# Patient Record
Sex: MALE | Race: BLACK OR AFRICAN AMERICAN | HISPANIC OR LATINO | Employment: UNEMPLOYED | URBAN - METROPOLITAN AREA
[De-identification: names, ages, dates, MRNs, and addresses within clinical notes are randomized per-mention and may not be internally consistent; named-entity substitution may affect disease eponyms.]

---

## 2023-11-08 PROBLEM — S06.9XAA: Status: ACTIVE | Noted: 2023-11-08

## 2023-11-08 PROBLEM — S43.421A SPRAIN OF RIGHT ROTATOR CUFF CAPSULE: Status: ACTIVE | Noted: 2023-11-08

## 2023-11-08 PROBLEM — M19.019 ARTHROPATHY OF SHOULDER REGION: Status: ACTIVE | Noted: 2023-11-08

## 2023-11-08 PROBLEM — M16.0 OSTEOARTHRITIS OF BOTH HIPS: Status: ACTIVE | Noted: 2023-11-08

## 2023-11-08 PROBLEM — Z79.891 LONG TERM (CURRENT) USE OF OPIATE ANALGESIC: Status: ACTIVE | Noted: 2023-11-08

## 2023-11-08 PROBLEM — M75.81: Status: ACTIVE | Noted: 2023-11-08

## 2023-11-08 RX ORDER — PANTOPRAZOLE SODIUM 40 MG/1
40 TABLET, DELAYED RELEASE ORAL DAILY
COMMUNITY
Start: 2023-01-27

## 2023-11-08 RX ORDER — METFORMIN HYDROCHLORIDE 1000 MG/1
1000 TABLET ORAL 2 TIMES DAILY
COMMUNITY

## 2023-11-08 RX ORDER — NAPROXEN 500 MG/1
500 TABLET ORAL 2 TIMES DAILY
COMMUNITY

## 2023-11-08 RX ORDER — ATORVASTATIN CALCIUM 40 MG/1
40 TABLET, FILM COATED ORAL
COMMUNITY
Start: 2023-10-30

## 2023-11-08 RX ORDER — AMLODIPINE BESYLATE 5 MG/1
5 TABLET ORAL DAILY
COMMUNITY
Start: 2023-07-28

## 2023-11-08 RX ORDER — NAPROXEN SODIUM 220 MG/1
81 TABLET, FILM COATED ORAL DAILY
COMMUNITY
Start: 2023-09-29

## 2023-11-08 RX ORDER — BISACODYL 10 MG/1
10 SUPPOSITORY RECTAL DAILY PRN
COMMUNITY
Start: 2023-01-31 | End: 2023-11-09 | Stop reason: ALTCHOICE

## 2023-11-08 RX ORDER — LUBIPROSTONE 24 UG/1
1 CAPSULE ORAL DAILY
COMMUNITY
Start: 2020-07-30

## 2023-11-08 RX ORDER — CELECOXIB 200 MG/1
200 CAPSULE ORAL 2 TIMES DAILY
COMMUNITY
Start: 2023-07-16

## 2023-11-08 RX ORDER — DICLOFENAC SODIUM 10 MG/G
4 GEL TOPICAL 4 TIMES DAILY
COMMUNITY
Start: 2023-09-09 | End: 2024-02-29 | Stop reason: SDUPTHER

## 2023-11-08 RX ORDER — CARVEDILOL 12.5 MG/1
12.5 TABLET ORAL
COMMUNITY
Start: 2023-10-30

## 2023-11-08 RX ORDER — ALLOPURINOL 300 MG/1
300 TABLET ORAL DAILY
COMMUNITY

## 2023-11-08 RX ORDER — BLOOD SUGAR DIAGNOSTIC
1 STRIP MISCELLANEOUS 2 TIMES DAILY
COMMUNITY
Start: 2023-06-17

## 2023-11-08 RX ORDER — AMOXICILLIN 250 MG
1 CAPSULE ORAL 2 TIMES DAILY
COMMUNITY
Start: 2014-12-18

## 2023-11-08 RX ORDER — ISOPROPYL ALCOHOL 0.75 G/1
1 SWAB TOPICAL 4 TIMES DAILY
COMMUNITY
Start: 2023-09-20

## 2023-11-08 RX ORDER — ENZALUTAMIDE 80 MG/1
80 TABLET ORAL
COMMUNITY
Start: 2023-07-20

## 2023-11-08 RX ORDER — LOVASTATIN 40 MG/1
40 TABLET ORAL NIGHTLY
COMMUNITY

## 2023-11-08 RX ORDER — COLCHICINE 0.6 MG/1
0.6 TABLET ORAL DAILY
COMMUNITY
Start: 2023-10-05

## 2023-11-08 RX ORDER — METOPROLOL TARTRATE 50 MG/1
50 TABLET ORAL 2 TIMES DAILY
COMMUNITY

## 2023-11-08 RX ORDER — ENALAPRIL MALEATE 10 MG/1
10 TABLET ORAL 2 TIMES DAILY
COMMUNITY

## 2023-11-08 RX ORDER — IBUPROFEN 800 MG/1
800 TABLET ORAL EVERY 8 HOURS PRN
COMMUNITY
Start: 2023-06-19

## 2023-11-08 RX ORDER — PYRIDOXINE HCL (VITAMIN B6) 50 MG
TABLET ORAL
COMMUNITY
Start: 2015-03-26

## 2023-11-08 RX ORDER — NALOXONE HYDROCHLORIDE 4 MG/.1ML
4 SPRAY NASAL AS NEEDED
COMMUNITY

## 2023-11-08 RX ORDER — DOCUSATE SODIUM 100 MG/1
100 CAPSULE, LIQUID FILLED ORAL 2 TIMES DAILY PRN
COMMUNITY
Start: 2023-10-05

## 2023-11-08 RX ORDER — SENNOSIDES 8.6 MG
1 TABLET ORAL 2 TIMES DAILY
COMMUNITY
Start: 2023-04-28

## 2023-11-08 RX ORDER — DEXAMETHASONE 4 MG/1
4 TABLET ORAL DAILY
COMMUNITY
Start: 2023-02-09

## 2023-11-08 RX ORDER — GABAPENTIN 300 MG/1
300 CAPSULE ORAL
COMMUNITY
Start: 2023-09-24

## 2023-11-08 RX ORDER — AMLODIPINE BESYLATE 10 MG/1
10 TABLET ORAL
COMMUNITY
Start: 2023-09-30

## 2023-11-08 RX ORDER — METHOCARBAMOL 750 MG/1
1 TABLET, FILM COATED ORAL 2 TIMES DAILY
COMMUNITY
Start: 2016-04-25

## 2023-11-08 RX ORDER — VALPROIC ACID 250 MG/1
750 CAPSULE, LIQUID FILLED ORAL 2 TIMES DAILY
COMMUNITY
Start: 2023-10-06

## 2023-11-08 RX ORDER — ACETAMINOPHEN 500 MG
1000 TABLET ORAL 2 TIMES DAILY PRN
COMMUNITY
Start: 2023-01-27

## 2023-11-08 RX ORDER — LANCETS
1 EACH MISCELLANEOUS 2 TIMES DAILY
COMMUNITY
Start: 2023-05-01

## 2023-11-08 RX ORDER — OXYCODONE AND ACETAMINOPHEN 10; 325 MG/1; MG/1
1 TABLET ORAL 4 TIMES DAILY PRN
COMMUNITY
Start: 2022-09-24 | End: 2023-11-09 | Stop reason: SDUPTHER

## 2023-11-09 ENCOUNTER — OFFICE VISIT (OUTPATIENT)
Dept: PAIN MEDICINE | Facility: CLINIC | Age: 63
End: 2023-11-09
Payer: COMMERCIAL

## 2023-11-09 DIAGNOSIS — S43.421A SPRAIN OF RIGHT ROTATOR CUFF CAPSULE, INITIAL ENCOUNTER: ICD-10-CM

## 2023-11-09 DIAGNOSIS — M75.81 INFRASPINATUS TENDINITIS, RIGHT: ICD-10-CM

## 2023-11-09 DIAGNOSIS — M19.019 ARTHROPATHY OF SHOULDER REGION: Primary | ICD-10-CM

## 2023-11-09 PROCEDURE — 99214 OFFICE O/P EST MOD 30 MIN: CPT | Performed by: PHYSICAL MEDICINE & REHABILITATION

## 2023-11-09 RX ORDER — OXYCODONE AND ACETAMINOPHEN 10; 325 MG/1; MG/1
1 TABLET ORAL 4 TIMES DAILY PRN
Qty: 112 TABLET | Refills: 0 | Status: SHIPPED | OUTPATIENT
Start: 2023-12-11 | End: 2023-12-21 | Stop reason: SDUPTHER

## 2023-11-09 RX ORDER — BISACODYL 5 MG
5 TABLET, DELAYED RELEASE (ENTERIC COATED) ORAL DAILY PRN
Qty: 30 TABLET | Refills: 1 | Status: SHIPPED | OUTPATIENT
Start: 2023-11-09 | End: 2023-12-09

## 2023-11-09 RX ORDER — OXYCODONE AND ACETAMINOPHEN 10; 325 MG/1; MG/1
1 TABLET ORAL 4 TIMES DAILY PRN
Qty: 112 TABLET | Refills: 0 | Status: SHIPPED | OUTPATIENT
Start: 2023-11-13 | End: 2023-12-11

## 2023-11-09 NOTE — PROGRESS NOTES
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 Rodrigues returns today for follow-up visit.  His wife came with him.  He is now using the wheelchair because injury to the hip none related to this pain he is treating with Ortho for the right hip and he get injections.    His main pain is on the right shoulder.The pain in the righ shoulder is deep and stabbing.  It is associated with sharp sensation inside the shoulder joint mainly with movements.  The pain is continuous at rest and it gets worse with reaching forward and overhead.  Also reaching outward increases the pain.  There is no radiation of the pain to the upper limb.  The pain is associated with tight muscle bands around the shoulder blade.  These gets worse with shoulder stabilization like working at shoulder level or even doing any activity with the upper limb when it is not supported.  There is occasional sensation of fine cracking inside the shoulder joint when the shoulder position is changed rapidly.  Also occasionally there is a sensation of fullness inside the shoulder joint.  The right shoulder catches when it reaches at shoulder level.  With maneuvering and external rotation the shoulder can go up few more degrees.  The right shoulder gets tired quickly with any activity.  Of note that we gave him an injection in the right shoulder in the past and those helped him he want to repeat those      Pain level without medication is 8/10 , with the medication pain level 2-3/10.       opioids treatment agreement 2023  Oarrs pulled and scanned in the chart  no concerns  last urine toxicology testing May of this year and was compatible we will repeat  Xray updated right shoulder  ORT Score is 0  Pain pathology and pain generators right shoulder  Modalities tried injection, surgery, physical therapy, TENS unit,  nonsteroidal anti-inflammatory medication       Denied any fever or chills. No weight loss and no night sweats. No cough or sputum production. No diarrhea   The constipation has been responding to fibers and over the counter medications.     No bladder and bowel incontinence and no other changes in bladder and bowel. No skin changes.  Reports tiredness and fatigability only if the pain is not controlled.   Denied opioids diversion and abuse and denies alcoholism. Denies overuse of the pain medications.    The control of the pain with the pain medications is helping the control of the symptoms and allowing the function and activities of daily living, enjoyment of life, improving the quality of life and sleep with less interruption by the pain. The goal is symptomatic control of the nonmalignant chronic pain and not to repair the permanent damage in the tissues inducing the chronic pain conditions. We are aiming to shift the focus from the nonmalignant chronic pain to other aspects of life by symptomatically treating this chronic pain. If this pain is not treated it will lead to major morbidity and it is also associated with increased risks of mortality. The patient understands those very clearly and also understand high risks of morbidity and mortality if not strictly adherent to the treatment recommendations and reporting any associated side effects. Also patient understand the full responsibility associated with these medications to avoid abuse or overuse or any use of these medications for anything besides treating the patient's own chronic pain and nothing else under any circumstances.        Physical examination  Awake, alert and oriented for time place and persons   declined Chaperone for the visit and was adequately  draped for the exam.      The right shoulder physical examination showed mild atrophy of the right supraspinatus compared to the left side.  Impingement at 90 degrees in forward flexion and  abduction.  This improved by few degrees upon external rotation.  Tight muscle bands and trigger points around the shoulder blade muscles.  No overt shoulder instability but tenderness on palpation of the subacromial area.  Negative cervical root tension sign.  Right shoulder strength is 4/5 in all directions.  No distal sensorimotor deficits associated with the right shoulder.      Diagnosis  Problem List Items Addressed This Visit       Arthropathy of shoulder region - Primary    Sprain of right rotator cuff capsule    Infraspinatus tendinitis, right        Plan  Reviewed the pain generators.  Went over the types of pain with neuropathic and nociceptive and different pathologies and therapeutic modalities. Discussed the mechanism of action of interventions from acupuncture, physical therapy , regular exercises, injections, botox, spinal cord stimulation, and role of surgery     Went over pathology of the intervertebral disc displacement and the anatomical relation to the Nerve roots and relation to the radicular symptoms. Went over treatment modalities with conservative treatment including acupuncture   and epidural steroid injection with fluoroscopy guidance and last resort of surgery    Based on the above findings and the clinical response to the opioids medications and improvement of the activities of daily living, sleep, and work performance. We made this complex decision to continue the opioids therapy in light of the evidence of the patient's responsibility in using the pain medications as prescribed for the nonmalignant chronic pain condition. We discussed about the use of the pain medications to treat the symptoms of chronic nonmalignant pain and we are not trying the repair the permanent damage in the tissues, rather we are trying to control the symptoms induced by the permanent damage to the tissues inducing the chronic pain condition and resulting disability. I explained the difference and discussed it  with the patient and stressed the importance of knowing the difference especially because of the potential side effects and the potential addicting effect and habit forming nature of the dangerous drugs we are using to treat the symptoms of the chronic pain.      We discussed that we are prescribing the medications on good giorgio and legitimate medical reason.     We reviewed the side effects and precautions of opioids prescriptions as discussed in the opioids treatment agreement.    realizes the interaction between the therapeutic classes including the respiratory depression and potential death     Random drug testing twice in 6 months we will submit     Oxycodone 10 mg 4 times a day.  He uses nonsteroidal anti-inflammatory medication over-the-counter.opioids sparing effect to allow keeping the opioids dose at minimal effective dose.  I went over the potential side effects of the NSAIDS on the gastrointestinal, renal and cardiovascular systems.       I detailed the side effects from the acetaminophen in the medication and made aware of those. I also explained about the cumulative effects on the organs and mainly the liver.     Given the opioids therapy , we discussed about the risk for accidental over dose on the pain medications, either for patient or other household. I went over the mechanism of action and mode of use of the Naloxone according to the  recommendations. I will provide a prescription for a kit.     Follow-up 8 weeks or earlier if needed     The level of clinical decision making in this office visit,  is high, given the high risks of complications with the morbidity and mortality due to the fact that acute and chronic pain may pose a threat to life and bodily function, if under treated, poorly treated, or with failure to maintain adequate treatment and timely medical follow up. Additionally over treatment has its own set of complications including overdosing on the pain medications and  also the habit forming potentials with the use of the medications used to treat chronic painful conditions including therapeutic classes classified as dangerous medications. Given the serious and fluctuating nature of pain level and instensity with extensive consideration for whenever pain changes, there is always the risk of prolonged functional impairment requiring close patient monitoring with regular assessments and reassessments and high level medical decision making at every office visit. The amount and complexity of data reviewed is high given the patient clinical presentation, labs,  data, radiology reports, and other tests as discussed during office visits. Pertinent data whether positive or negative were taken in consideration in the process of making this high level medical decision.    For the right shoulder we will put in a C9 to ask [facility] for right shoulder intra-articular steroid injection

## 2023-12-21 ENCOUNTER — OFFICE VISIT (OUTPATIENT)
Dept: PAIN MEDICINE | Facility: CLINIC | Age: 63
End: 2023-12-21
Payer: COMMERCIAL

## 2023-12-21 DIAGNOSIS — S43.421A SPRAIN OF RIGHT ROTATOR CUFF CAPSULE, INITIAL ENCOUNTER: ICD-10-CM

## 2023-12-21 DIAGNOSIS — M75.81 INFRASPINATUS TENDINITIS, RIGHT: ICD-10-CM

## 2023-12-21 DIAGNOSIS — M19.019 ARTHROPATHY OF SHOULDER REGION: Primary | ICD-10-CM

## 2023-12-21 PROCEDURE — 99214 OFFICE O/P EST MOD 30 MIN: CPT | Performed by: PHYSICAL MEDICINE & REHABILITATION

## 2023-12-21 PROCEDURE — 20610 DRAIN/INJ JOINT/BURSA W/O US: CPT | Performed by: PHYSICAL MEDICINE & REHABILITATION

## 2023-12-21 RX ORDER — OXYCODONE AND ACETAMINOPHEN 10; 325 MG/1; MG/1
1 TABLET ORAL 4 TIMES DAILY PRN
Qty: 112 TABLET | Refills: 0 | Status: SHIPPED | OUTPATIENT
Start: 2024-01-08 | End: 2024-01-11 | Stop reason: SDUPTHER

## 2023-12-21 RX ORDER — OXYCODONE AND ACETAMINOPHEN 10; 325 MG/1; MG/1
1 TABLET ORAL 4 TIMES DAILY PRN
Qty: 112 TABLET | Refills: 0 | Status: SHIPPED | OUTPATIENT
Start: 2024-02-05 | End: 2024-02-29 | Stop reason: SDUPTHER

## 2023-12-21 NOTE — PROGRESS NOTES
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  It is associated with sharp sensation inside the shoulder joint mainly with movements.  The pain is continuous at rest and it gets worse with reaching forward and overhead.  Also reaching outward increases the pain.  There is no radiation of the pain to the upper limb.  The pain is associated with tight muscle bands around the shoulder blade.  These gets worse with shoulder stabilization like working at shoulder level or even doing any activity with the upper limb when it is not supported.  There is occasional sensation of fine cracking inside the shoulder joint when the shoulder position is changed rapidly.  Also occasionally there is a sensation of fullness inside the shoulder joint.  The right shoulder catches when it reaches at shoulder level.  With maneuvering and external rotation the shoulder can go up few more degrees.  The right shoulder gets tired quickly with any activity.       Pain level without medication is 8/10 , with the medication pain level 4/10.     The pain meds are helping control the pain and improving Activities of Daily living and quality of life and quality of sleep.    opioids treatment agreement 2023  Oarrs pulled and scanned in the chart  no concerns  last urine toxicology testing earlier this year and it was compliant we will repeat  Xray updated shoulder 0  ORT Score is  0  Pain pathology and pain generators shoulder   Modalities tried injection, surgery, physical therapy, TENS unit, nonsteroidal anti-inflammatory medication injection and multiple surgeriey     Denied any fever or chills. No weight loss and no night sweats. No cough or sputum production. No diarrhea   The constipation has been  responding to fibers and over the counter medications.     No bladder and bowel incontinence and no other changes in bladder and bowel. No skin changes.  Reports tiredness and fatigability only if the pain is not controlled.   Denied opioids diversion and abuse and denies alcoholism. Denies overuse of the pain medications.    The control of the pain with the pain medications is helping the control of the symptoms and allowing the function and activities of daily living, enjoyment of life, improving the quality of life and sleep with less interruption by the pain. The goal is symptomatic control of the nonmalignant chronic pain and not to repair the permanent damage in the tissues inducing the chronic pain conditions. We are aiming to shift the focus from the nonmalignant chronic pain to other aspects of life by symptomatically treating this chronic pain. If this pain is not treated it will lead to major morbidity and it is also associated with increased risks of mortality. The patient understands those very clearly and also understand high risks of morbidity and mortality if not strictly adherent to the treatment recommendations and reporting any associated side effects. Also patient understand the full responsibility associated with these medications to avoid abuse or overuse or any use of these medications for anything besides treating the patient's own chronic pain and nothing else under any circumstances.        Physical examination  Awake, alert and oriented for time place and persons   declined Chaperone for the visit and was adequately  draped for the exam.      The right shoulder physical examination showed mild atrophy of the right supraspinatus compared to the left side.  Impingement at 10 degrees in forward flexion and abduction.  This improved by few degrees upon external rotation.  Tight muscle bands and trigger points around the shoulder blade muscles.  No overt shoulder instability but tenderness on  palpation of the subacromial area.  Negative cervical root tension sign.  Right shoulder strength is 4/5 in all directions.  No distal sensorimotor deficits associated with the right shoulder.     Diagnosis  Problem List Items Addressed This Visit       Arthropathy of shoulder region - Primary    Sprain of right rotator cuff capsule    Infraspinatus tendinitis, right        Plan  Reviewed the pain generators.  Went over the types of pain with neuropathic and nociceptive and different pathologies and therapeutic modalities. Discussed the mechanism of action of interventions from acupuncture, physical therapy , regular exercises, injections, botox, spinal cord stimulation, and role of surgery     Went over pathology of the intervertebral disc displacement and the anatomical relation to the Nerve roots and relation to the radicular symptoms. Went over treatment modalities with conservative treatment including acupuncture   and epidural steroid injection with fluoroscopy guidance and last resort of surgery    Based on the above findings and the clinical response to the opioids medications and improvement of the activities of daily living, sleep, and work performance. We made this complex decision to continue the opioids therapy in light of the evidence of the patient's responsibility in using the pain medications as prescribed for the nonmalignant chronic pain condition. We discussed about the use of the pain medications to treat the symptoms of chronic nonmalignant pain and we are not trying the repair the permanent damage in the tissues, rather we are trying to control the symptoms induced by the permanent damage to the tissues inducing the chronic pain condition and resulting disability. I explained the difference and discussed it with the patient and stressed the importance of knowing the difference especially because of the potential side effects and the potential addicting effect and habit forming nature of the  dangerous drugs we are using to treat the symptoms of the chronic pain.      We discussed that we are prescribing the medications on good giorgio and legitimate medical reason.     We reviewed the side effects and precautions of opioids prescriptions as discussed in the opioids treatment agreement.    realizes the interaction between the therapeutic classes including the respiratory depression and potential death     Random drug testing twice in 6 months we will submit     Oxycoconde 10qid   has a narcan at home know how and when to use it if needed.     Discussed about NSAIDS and I explained about the opioids sparing effect to allow keeping the opioids dose at minimal effective dose.   I went over the potential side effects of the NSAIDS on the gastrointestinal, renal and cardiovascular systems.      I detailed the side effects from the acetaminophen in the medication and made aware of those. I also explained about the cumulative effects on the organs and mainly the liver.     Given the opioids therapy , we discussed about the risk for accidental over dose on the pain medications, either for patient or other household. I went over the mechanism of action and mode of use of the Naloxone according to the  recommendations. I will provide a prescription for a kit.     Follow-up 8 weeks or earlier if needed     The level of clinical decision making in this office visit,  is high, given the high risks of complications with the morbidity and mortality due to the fact that acute and chronic pain may pose a threat to life and bodily function, if under treated, poorly treated, or with failure to maintain adequate treatment and timely medical follow up. Additionally over treatment has its own set of complications including overdosing on the pain medications and also the habit forming potentials with the use of the medications used to treat chronic painful conditions including therapeutic classes classified as  dangerous medications. Given the serious and fluctuating nature of pain level and instensity with extensive consideration for whenever pain changes, there is always the risk of prolonged functional impairment requiring close patient monitoring with regular assessments and reassessments and high level medical decision making at every office visit. The amount and complexity of data reviewed is high given the patient clinical presentation, labs,  data, radiology reports, and other tests as discussed during office visits. Pertinent data whether positive or negative were taken in consideration in the process of making this high level medical decision.      He is approved for the R shoulder intraarticular steroid injection   We discussed the risks and benefits of the injections including but not limited to nerve injury, infection, bleeding, headaches and paralysis and remotely death. The patient agreed to proceed, will perform the procedure with sterile techniques. Declined Chaperone for the procedure.  After appropriate draping, I used 40 mg of kenalog mixed with 5 cc of lidocaine and injected the right shoulder      post procedure care was discussed with the patient who agreed to proceed.  procedure tolerated very well. No complications encountered.

## 2024-01-11 ENCOUNTER — TELEPHONE (OUTPATIENT)
Dept: PAIN MEDICINE | Facility: CLINIC | Age: 64
End: 2024-01-11
Payer: COMMERCIAL

## 2024-01-11 DIAGNOSIS — M75.81 INFRASPINATUS TENDINITIS, RIGHT: ICD-10-CM

## 2024-01-11 DIAGNOSIS — S43.421A SPRAIN OF RIGHT ROTATOR CUFF CAPSULE, INITIAL ENCOUNTER: ICD-10-CM

## 2024-01-11 DIAGNOSIS — M19.019 ARTHROPATHY OF SHOULDER REGION: ICD-10-CM

## 2024-01-11 RX ORDER — OXYCODONE AND ACETAMINOPHEN 10; 325 MG/1; MG/1
1 TABLET ORAL 4 TIMES DAILY PRN
Qty: 84 TABLET | Refills: 0 | Status: SHIPPED | OUTPATIENT
Start: 2024-01-11 | End: 2024-02-01

## 2024-01-11 NOTE — TELEPHONE ENCOUNTER
Pt asking for balance of #84 percocet to be sent to pharmacy on file. Was only given #28 pills for 7 days due to change of insurance

## 2024-02-29 ENCOUNTER — OFFICE VISIT (OUTPATIENT)
Dept: PAIN MEDICINE | Facility: CLINIC | Age: 64
End: 2024-02-29
Payer: COMMERCIAL

## 2024-02-29 DIAGNOSIS — M19.019 ARTHROPATHY OF SHOULDER REGION: Primary | ICD-10-CM

## 2024-02-29 DIAGNOSIS — M75.81 INFRASPINATUS TENDINITIS, RIGHT: ICD-10-CM

## 2024-02-29 DIAGNOSIS — S43.421A SPRAIN OF RIGHT ROTATOR CUFF CAPSULE, INITIAL ENCOUNTER: ICD-10-CM

## 2024-02-29 PROCEDURE — 99214 OFFICE O/P EST MOD 30 MIN: CPT | Performed by: PHYSICAL MEDICINE & REHABILITATION

## 2024-02-29 RX ORDER — OXYCODONE AND ACETAMINOPHEN 10; 325 MG/1; MG/1
1 TABLET ORAL 4 TIMES DAILY PRN
Qty: 112 TABLET | Refills: 0 | Status: SHIPPED | OUTPATIENT
Start: 2024-04-01 | End: 2024-04-29

## 2024-02-29 RX ORDER — METHYLPREDNISOLONE 4 MG/1
TABLET ORAL
Qty: 21 TABLET | Refills: 0 | Status: SHIPPED | OUTPATIENT
Start: 2024-02-29

## 2024-02-29 RX ORDER — DICLOFENAC SODIUM 10 MG/G
4 GEL TOPICAL 4 TIMES DAILY
Qty: 120 G | Refills: 2 | Status: SHIPPED | OUTPATIENT
Start: 2024-02-29

## 2024-02-29 RX ORDER — OXYCODONE AND ACETAMINOPHEN 10; 325 MG/1; MG/1
1 TABLET ORAL 4 TIMES DAILY PRN
Qty: 112 TABLET | Refills: 0 | Status: SHIPPED | OUTPATIENT
Start: 2024-03-04 | End: 2024-04-01

## 2024-02-29 NOTE — PROGRESS NOTES
[place] [facility] [id number]  DOI 12.27.2002   · Arthropathy of shoulder region (716.91) (M19.019)   · Infraspinatus tendinitis, right (726.10) (M75.81)    Chief complaint  Pain in R shoulder    History  Mr Rodrigues is back for visit  Continues to have pain in the r shoulder  (Unrelated he has prostate cancer getting treatment)  Severe shoulder limited ROM  The pain in the righ shoulder is deep and stabbing.  It is associated with sharp sensation inside the shoulder joint mainly with movements.  The pain is continuous at rest and it gets worse with reaching forward and overhead.  Also reaching outward increases the pain.  There is no radiation of the pain to the upper limb.  The pain is associated with tight muscle bands around the shoulder blade.  These gets worse with shoulder stabilization like working at shoulder level or even doing any activity with the upper limb when it is not supported.  There is occasional sensation of fine cracking inside the shoulder joint when the shoulder position is changed rapidly.  Also occasionally there is a sensation of fullness inside the shoulder joint.  The right shoulder catches when it reaches at shoulder level.  With maneuvering and external rotation the shoulder can go up few more degrees.  The right shoulder gets tired quickly with any activity.       Pain level without medication is 8/10 , with the medication pain level 3 to 4 /10.     The pain meds are helping control the pain and improving Activities of Daily living and quality of life and quality of sleep.    opioids treatment agreement Feb 2024  PDI (Pain Disability Index) score: 62  Oarrs pulled and scanned in the chart  no concerns  last urine toxicology testing earlier this year and it was compliant we will repeat  Xray updated spine  ORT Score is  0  Pain pathology and pain generators shoulder   Modalities tried injection, surgery, physical therapy, TENS unit, nonsteroidal anti-inflammatory medication       Denied any  fever or chills. No weight loss and no night sweats. No cough or sputum production. No diarrhea   The constipation has been responding to fibers and over the counter medications.     No bladder and bowel incontinence and no other changes in bladder and bowel. No skin changes.  Reports tiredness and fatigability only if the pain is not controlled.   Denied opioids diversion and abuse and denies alcoholism. Denies overuse of the pain medications.    The control of the pain with the pain medications is helping the control of the symptoms and allowing the function and activities of daily living, enjoyment of life, improving the quality of life and sleep with less interruption by the pain. The goal is symptomatic control of the nonmalignant chronic pain and not to repair the permanent damage in the tissues inducing the chronic pain conditions. We are aiming to shift the focus from the nonmalignant chronic pain to other aspects of life by symptomatically treating this chronic pain. If this pain is not treated it will lead to major morbidity and it is also associated with increased risks of mortality. The patient understands those very clearly and also understand high risks of morbidity and mortality if not strictly adherent to the treatment recommendations and reporting any associated side effects. Also patient understand the full responsibility associated with these medications to avoid abuse or overuse or any use of these medications for anything besides treating the patient's own chronic pain and nothing else under any circumstances.        Physical examination  Awake, alert and oriented for time place and persons   declined Chaperone for the visit and was adequately  draped for the exam.    The right shoulder physical examination showed mild atrophy of the right supraspinatus compared to the left side.  Impingement at 30 degrees in forward flexion and abduction.  This improved by few degrees upon external rotation.   Tight muscle bands and trigger points around the shoulder blade muscles.  No overt shoulder instability but tenderness on palpation of the subacromial area.  Negative cervical root tension sign.  Right shoulder strength is 4/5 in all directions.  No distal sensorimotor deficits associated with the right shoulder.     Diagnosis  Problem List Items Addressed This Visit       Arthropathy of shoulder region - Primary    Relevant Medications    oxyCODONE-acetaminophen (Percocet)  mg tablet (Start on 3/4/2024)    oxyCODONE-acetaminophen (Percocet)  mg tablet (Start on 4/1/2024)    diclofenac sodium (Voltaren) 1 % gel    methylPREDNISolone (Medrol Dospak) 4 mg tablets    Sprain of right rotator cuff capsule    Relevant Medications    oxyCODONE-acetaminophen (Percocet)  mg tablet (Start on 3/4/2024)    oxyCODONE-acetaminophen (Percocet)  mg tablet (Start on 4/1/2024)    diclofenac sodium (Voltaren) 1 % gel    methylPREDNISolone (Medrol Dospak) 4 mg tablets    Infraspinatus tendinitis, right    Relevant Medications    oxyCODONE-acetaminophen (Percocet)  mg tablet (Start on 3/4/2024)    oxyCODONE-acetaminophen (Percocet)  mg tablet (Start on 4/1/2024)    diclofenac sodium (Voltaren) 1 % gel    methylPREDNISolone (Medrol Dospak) 4 mg tablets        Plan  Reviewed the pain generators.  Went over the types of pain with neuropathic and nociceptive and different pathologies and therapeutic modalities. Discussed the mechanism of action of interventions from acupuncture, physical therapy , regular exercises, injections, botox, spinal cord stimulation, and role of surgery     Went over pathology of the intervertebral disc displacement and the anatomical relation to the Nerve roots and relation to the radicular symptoms. Went over treatment modalities with conservative treatment including acupuncture   and epidural steroid injection with fluoroscopy guidance and last resort of surgery    Based on  the above findings and the clinical response to the opioids medications and improvement of the activities of daily living, sleep, and work performance. We made this complex decision to continue the opioids therapy in light of the evidence of the patient's responsibility in using the pain medications as prescribed for the nonmalignant chronic pain condition. We discussed about the use of the pain medications to treat the symptoms of chronic nonmalignant pain and we are not trying the repair the permanent damage in the tissues, rather we are trying to control the symptoms induced by the permanent damage to the tissues inducing the chronic pain condition and resulting disability. I explained the difference and discussed it with the patient and stressed the importance of knowing the difference especially because of the potential side effects and the potential addicting effect and habit forming nature of the dangerous drugs we are using to treat the symptoms of the chronic pain.      We discussed that we are prescribing the medications on good giorgio and legitimate medical reason.     We reviewed the side effects and precautions of opioids prescriptions as discussed in the opioids treatment agreement.    realizes the interaction between the therapeutic classes including the respiratory depression and potential death     Random drug testing twice in 6 months we will submit     Oxycodenoe 10 qid  Consider PNS  has a narcan at home know how and when to use it if needed.  Discussed about considering cut back on pain medications     Discussed about NSAIDS and I explained about the opioids sparing effect to allow keeping the opioids dose at minimal effective dose.   I went over the potential side effects of the NSAIDS on the gastrointestinal, renal and cardiovascular systems.      I detailed the side effects from the acetaminophen in the medication and made aware of those. I also explained about the cumulative effects on the  organs and mainly the liver.     Given the opioids therapy , we discussed about the risk for accidental over dose on the pain medications, either for patient or other household. I went over the mechanism of action and mode of use of the Naloxone according to the  recommendations. I will provide a prescription for a kit.     Follow-up 8 weeks or earlier if needed     The level of clinical decision making in this office visit,  is high, given the high risks of complications with the morbidity and mortality due to the fact that acute and chronic pain may pose a threat to life and bodily function, if under treated, poorly treated, or with failure to maintain adequate treatment and timely medical follow up. Additionally over treatment has its own set of complications including overdosing on the pain medications and also the habit forming potentials with the use of the medications used to treat chronic painful conditions including therapeutic classes classified as dangerous medications. Given the serious and fluctuating nature of pain level and instensity with extensive consideration for whenever pain changes, there is always the risk of prolonged functional impairment requiring close patient monitoring with regular assessments and reassessments and high level medical decision making at every office visit. The amount and complexity of data reviewed is high given the patient clinical presentation, labs,  data, radiology reports, and other tests as discussed during office visits. Pertinent data whether positive or negative were taken in consideration in the process of making this high level medical decision.

## 2024-04-25 ENCOUNTER — APPOINTMENT (OUTPATIENT)
Dept: PAIN MEDICINE | Facility: CLINIC | Age: 64
End: 2024-04-25
Payer: COMMERCIAL

## 2024-05-03 ENCOUNTER — LAB REQUISITION (OUTPATIENT)
Dept: LAB | Facility: HOSPITAL | Age: 64
End: 2024-05-03
Payer: COMMERCIAL

## 2024-05-03 DIAGNOSIS — R63.0 ANOREXIA: ICD-10-CM

## 2024-05-03 DIAGNOSIS — D64.9 ANEMIA, UNSPECIFIED: ICD-10-CM

## 2024-05-03 LAB
BASOPHILS # BLD AUTO: 0 X10*3/UL (ref 0–0.1)
BASOPHILS NFR BLD AUTO: 0 %
EOSINOPHIL # BLD AUTO: 0.04 X10*3/UL (ref 0–0.7)
EOSINOPHIL NFR BLD AUTO: 1.4 %
ERYTHROCYTE [DISTWIDTH] IN BLOOD BY AUTOMATED COUNT: 16.9 % (ref 11.5–14.5)
HCT VFR BLD AUTO: 24.8 % (ref 41–52)
HGB BLD-MCNC: 7.6 G/DL (ref 13.5–17.5)
IMM GRANULOCYTES # BLD AUTO: 0 X10*3/UL (ref 0–0.7)
IMM GRANULOCYTES NFR BLD AUTO: 0 % (ref 0–0.9)
LYMPHOCYTES # BLD AUTO: 0.44 X10*3/UL (ref 1.2–4.8)
LYMPHOCYTES NFR BLD AUTO: 15.3 %
MCH RBC QN AUTO: 27.6 PG (ref 26–34)
MCHC RBC AUTO-ENTMCNC: 30.6 G/DL (ref 32–36)
MCV RBC AUTO: 90 FL (ref 80–100)
MONOCYTES # BLD AUTO: 0.31 X10*3/UL (ref 0.1–1)
MONOCYTES NFR BLD AUTO: 10.8 %
NEUTROPHILS # BLD AUTO: 2.09 X10*3/UL (ref 1.2–7.7)
NEUTROPHILS NFR BLD AUTO: 72.5 %
NRBC BLD-RTO: 0 /100 WBCS (ref 0–0)
PLATELET # BLD AUTO: 106 X10*3/UL (ref 150–450)
RBC # BLD AUTO: 2.75 X10*6/UL (ref 4.5–5.9)
WBC # BLD AUTO: 2.9 X10*3/UL (ref 4.4–11.3)

## 2024-05-03 PROCEDURE — 85025 COMPLETE CBC W/AUTO DIFF WBC: CPT

## 2024-07-16 ENCOUNTER — APPOINTMENT (OUTPATIENT)
Dept: CARDIOLOGY | Facility: HOSPITAL | Age: 64
DRG: 393 | End: 2024-07-16
Payer: MEDICARE

## 2024-07-16 ENCOUNTER — HOSPITAL ENCOUNTER (INPATIENT)
Facility: HOSPITAL | Age: 64
Discharge: NURSING, REHAB OR LONG-TERM CARE | DRG: 393 | End: 2024-07-16
Attending: STUDENT IN AN ORGANIZED HEALTH CARE EDUCATION/TRAINING PROGRAM | Admitting: INTERNAL MEDICINE
Payer: MEDICARE

## 2024-07-16 ENCOUNTER — APPOINTMENT (OUTPATIENT)
Dept: RADIOLOGY | Facility: HOSPITAL | Age: 64
DRG: 393 | End: 2024-07-16
Payer: MEDICARE

## 2024-07-16 DIAGNOSIS — K92.2 GASTROINTESTINAL HEMORRHAGE, UNSPECIFIED GASTROINTESTINAL HEMORRHAGE TYPE: Primary | ICD-10-CM

## 2024-07-16 DIAGNOSIS — I10 HYPERTENSION, UNSPECIFIED TYPE: ICD-10-CM

## 2024-07-16 LAB
ABO GROUP (TYPE) IN BLOOD: NORMAL
ABO GROUP (TYPE) IN BLOOD: NORMAL
ALBUMIN SERPL BCP-MCNC: 3.4 G/DL (ref 3.4–5)
ALP SERPL-CCNC: 83 U/L (ref 33–136)
ALT SERPL W P-5'-P-CCNC: 5 U/L (ref 10–52)
ANION GAP SERPL CALC-SCNC: 11 MMOL/L (ref 10–20)
ANTIBODY SCREEN: NORMAL
ANTIBODY SCREEN: NORMAL
APTT PPP: 37 SECONDS (ref 27–38)
AST SERPL W P-5'-P-CCNC: 10 U/L (ref 9–39)
BASOPHILS # BLD AUTO: 0.06 X10*3/UL (ref 0–0.1)
BASOPHILS NFR BLD AUTO: 2.4 %
BILIRUB SERPL-MCNC: 0.4 MG/DL (ref 0–1.2)
BUN SERPL-MCNC: 11 MG/DL (ref 6–23)
CALCIUM SERPL-MCNC: 7.9 MG/DL (ref 8.6–10.3)
CHLORIDE SERPL-SCNC: 102 MMOL/L (ref 98–107)
CO2 SERPL-SCNC: 26 MMOL/L (ref 21–32)
CREAT SERPL-MCNC: 1.12 MG/DL (ref 0.5–1.3)
EGFRCR SERPLBLD CKD-EPI 2021: 74 ML/MIN/1.73M*2
EOSINOPHIL # BLD AUTO: 0.26 X10*3/UL (ref 0–0.7)
EOSINOPHIL NFR BLD AUTO: 10.6 %
ERYTHROCYTE [DISTWIDTH] IN BLOOD BY AUTOMATED COUNT: 16.3 % (ref 11.5–14.5)
GLUCOSE BLD MANUAL STRIP-MCNC: 103 MG/DL (ref 74–99)
GLUCOSE BLD MANUAL STRIP-MCNC: 99 MG/DL (ref 74–99)
GLUCOSE SERPL-MCNC: 104 MG/DL (ref 74–99)
HCT VFR BLD AUTO: 33.9 % (ref 41–52)
HCT VFR BLD AUTO: 34.1 % (ref 41–52)
HGB BLD-MCNC: 10.6 G/DL (ref 13.5–17.5)
HGB BLD-MCNC: 11.1 G/DL (ref 13.5–17.5)
IMM GRANULOCYTES # BLD AUTO: 0.05 X10*3/UL (ref 0–0.7)
IMM GRANULOCYTES NFR BLD AUTO: 2 % (ref 0–0.9)
INR PPP: 1.3 (ref 0.9–1.1)
LACTATE SERPL-SCNC: 1.1 MMOL/L (ref 0.4–2)
LYMPHOCYTES # BLD AUTO: 0.23 X10*3/UL (ref 1.2–4.8)
LYMPHOCYTES NFR BLD AUTO: 9.3 %
MCH RBC QN AUTO: 27.6 PG (ref 26–34)
MCHC RBC AUTO-ENTMCNC: 31.3 G/DL (ref 32–36)
MCV RBC AUTO: 88 FL (ref 80–100)
MONOCYTES # BLD AUTO: 0.62 X10*3/UL (ref 0.1–1)
MONOCYTES NFR BLD AUTO: 25.2 %
NEUTROPHILS # BLD AUTO: 1.24 X10*3/UL (ref 1.2–7.7)
NEUTROPHILS NFR BLD AUTO: 50.5 %
NRBC BLD-RTO: 0 /100 WBCS (ref 0–0)
PLATELET # BLD AUTO: 93 X10*3/UL (ref 150–450)
POTASSIUM SERPL-SCNC: 4 MMOL/L (ref 3.5–5.3)
PROT SERPL-MCNC: 5.9 G/DL (ref 6.4–8.2)
PROTHROMBIN TIME: 14.1 SECONDS (ref 9.8–12.8)
RBC # BLD AUTO: 3.84 X10*6/UL (ref 4.5–5.9)
RH FACTOR (ANTIGEN D): NORMAL
RH FACTOR (ANTIGEN D): NORMAL
SODIUM SERPL-SCNC: 135 MMOL/L (ref 136–145)
WBC # BLD AUTO: 2.5 X10*3/UL (ref 4.4–11.3)

## 2024-07-16 PROCEDURE — 93005 ELECTROCARDIOGRAM TRACING: CPT

## 2024-07-16 PROCEDURE — 2500000004 HC RX 250 GENERAL PHARMACY W/ HCPCS (ALT 636 FOR OP/ED): Performed by: STUDENT IN AN ORGANIZED HEALTH CARE EDUCATION/TRAINING PROGRAM

## 2024-07-16 PROCEDURE — 85014 HEMATOCRIT: CPT | Performed by: PHYSICIAN ASSISTANT

## 2024-07-16 PROCEDURE — 99223 1ST HOSP IP/OBS HIGH 75: CPT | Performed by: STUDENT IN AN ORGANIZED HEALTH CARE EDUCATION/TRAINING PROGRAM

## 2024-07-16 PROCEDURE — 36415 COLL VENOUS BLD VENIPUNCTURE: CPT | Performed by: PHYSICIAN ASSISTANT

## 2024-07-16 PROCEDURE — 99223 1ST HOSP IP/OBS HIGH 75: CPT | Performed by: PHYSICIAN ASSISTANT

## 2024-07-16 PROCEDURE — G0378 HOSPITAL OBSERVATION PER HR: HCPCS

## 2024-07-16 PROCEDURE — 2500000001 HC RX 250 WO HCPCS SELF ADMINISTERED DRUGS (ALT 637 FOR MEDICARE OP): Performed by: PHYSICIAN ASSISTANT

## 2024-07-16 PROCEDURE — 36415 COLL VENOUS BLD VENIPUNCTURE: CPT | Performed by: STUDENT IN AN ORGANIZED HEALTH CARE EDUCATION/TRAINING PROGRAM

## 2024-07-16 PROCEDURE — 86901 BLOOD TYPING SEROLOGIC RH(D): CPT | Performed by: STUDENT IN AN ORGANIZED HEALTH CARE EDUCATION/TRAINING PROGRAM

## 2024-07-16 PROCEDURE — 2500000001 HC RX 250 WO HCPCS SELF ADMINISTERED DRUGS (ALT 637 FOR MEDICARE OP): Performed by: STUDENT IN AN ORGANIZED HEALTH CARE EDUCATION/TRAINING PROGRAM

## 2024-07-16 PROCEDURE — 85025 COMPLETE CBC W/AUTO DIFF WBC: CPT | Performed by: STUDENT IN AN ORGANIZED HEALTH CARE EDUCATION/TRAINING PROGRAM

## 2024-07-16 PROCEDURE — 71045 X-RAY EXAM CHEST 1 VIEW: CPT

## 2024-07-16 PROCEDURE — 99285 EMERGENCY DEPT VISIT HI MDM: CPT

## 2024-07-16 PROCEDURE — 83605 ASSAY OF LACTIC ACID: CPT | Performed by: STUDENT IN AN ORGANIZED HEALTH CARE EDUCATION/TRAINING PROGRAM

## 2024-07-16 PROCEDURE — 85610 PROTHROMBIN TIME: CPT | Performed by: STUDENT IN AN ORGANIZED HEALTH CARE EDUCATION/TRAINING PROGRAM

## 2024-07-16 PROCEDURE — C9113 INJ PANTOPRAZOLE SODIUM, VIA: HCPCS | Performed by: STUDENT IN AN ORGANIZED HEALTH CARE EDUCATION/TRAINING PROGRAM

## 2024-07-16 PROCEDURE — 80053 COMPREHEN METABOLIC PANEL: CPT | Performed by: STUDENT IN AN ORGANIZED HEALTH CARE EDUCATION/TRAINING PROGRAM

## 2024-07-16 PROCEDURE — 71045 X-RAY EXAM CHEST 1 VIEW: CPT | Performed by: RADIOLOGY

## 2024-07-16 PROCEDURE — 82947 ASSAY GLUCOSE BLOOD QUANT: CPT

## 2024-07-16 RX ORDER — FLUTICASONE PROPIONATE 50 MCG
2 SPRAY, SUSPENSION (ML) NASAL DAILY
COMMUNITY

## 2024-07-16 RX ORDER — ERGOCALCIFEROL 1.25 MG/1
1.25 CAPSULE ORAL
COMMUNITY

## 2024-07-16 RX ORDER — ALBUTEROL SULFATE 0.83 MG/ML
2.5 SOLUTION RESPIRATORY (INHALATION) EVERY 4 HOURS PRN
Status: DISCONTINUED | OUTPATIENT
Start: 2024-07-16 | End: 2024-07-16

## 2024-07-16 RX ORDER — ONDANSETRON HYDROCHLORIDE 8 MG/1
8 TABLET, FILM COATED ORAL EVERY 6 HOURS PRN
COMMUNITY

## 2024-07-16 RX ORDER — MESALAMINE 1000 MG/1
1000 SUPPOSITORY RECTAL DAILY PRN
COMMUNITY

## 2024-07-16 RX ORDER — INSULIN LISPRO 100 [IU]/ML
0-5 INJECTION, SOLUTION INTRAVENOUS; SUBCUTANEOUS
Status: DISCONTINUED | OUTPATIENT
Start: 2024-07-16 | End: 2024-07-22 | Stop reason: HOSPADM

## 2024-07-16 RX ORDER — CARVEDILOL 12.5 MG/1
12.5 TABLET ORAL 2 TIMES DAILY
Status: DISCONTINUED | OUTPATIENT
Start: 2024-07-16 | End: 2024-07-22 | Stop reason: HOSPADM

## 2024-07-16 RX ORDER — ACETAMINOPHEN 325 MG/1
650 TABLET ORAL EVERY 4 HOURS PRN
COMMUNITY

## 2024-07-16 RX ORDER — PANTOPRAZOLE SODIUM 40 MG/10ML
40 INJECTION, POWDER, LYOPHILIZED, FOR SOLUTION INTRAVENOUS 2 TIMES DAILY
Status: DISCONTINUED | OUTPATIENT
Start: 2024-07-17 | End: 2024-07-22 | Stop reason: HOSPADM

## 2024-07-16 RX ORDER — ONDANSETRON 4 MG/1
4 TABLET, FILM COATED ORAL EVERY 6 HOURS PRN
Status: DISCONTINUED | OUTPATIENT
Start: 2024-07-16 | End: 2024-07-22 | Stop reason: HOSPADM

## 2024-07-16 RX ORDER — BISACODYL 10 MG/1
10 SUPPOSITORY RECTAL DAILY PRN
COMMUNITY

## 2024-07-16 RX ORDER — POLYETHYLENE GLYCOL 3350 17 G/17G
17 POWDER, FOR SOLUTION ORAL 2 TIMES DAILY
COMMUNITY

## 2024-07-16 RX ORDER — CALCIUM CARBONATE 200(500)MG
2 TABLET,CHEWABLE ORAL 3 TIMES DAILY
Status: DISCONTINUED | OUTPATIENT
Start: 2024-07-16 | End: 2024-07-22 | Stop reason: HOSPADM

## 2024-07-16 RX ORDER — DARBEPOETIN ALFA 100 UG/.5ML
100 INJECTION, SOLUTION INTRAVENOUS; SUBCUTANEOUS
COMMUNITY

## 2024-07-16 RX ORDER — FUROSEMIDE 40 MG/1
40 TABLET ORAL DAILY
COMMUNITY

## 2024-07-16 RX ORDER — GABAPENTIN 300 MG/1
300 CAPSULE ORAL 2 TIMES DAILY
Status: DISCONTINUED | OUTPATIENT
Start: 2024-07-16 | End: 2024-07-22 | Stop reason: HOSPADM

## 2024-07-16 RX ORDER — LORATADINE 10 MG/1
10 TABLET ORAL DAILY
COMMUNITY

## 2024-07-16 RX ORDER — FUROSEMIDE 40 MG/1
40 TABLET ORAL DAILY
Status: DISCONTINUED | OUTPATIENT
Start: 2024-07-16 | End: 2024-07-22 | Stop reason: HOSPADM

## 2024-07-16 RX ORDER — AMLODIPINE BESYLATE 10 MG/1
10 TABLET ORAL DAILY
Status: DISCONTINUED | OUTPATIENT
Start: 2024-07-16 | End: 2024-07-22 | Stop reason: HOSPADM

## 2024-07-16 RX ORDER — ERGOCALCIFEROL 1.25 MG/1
1250 CAPSULE ORAL
Status: DISCONTINUED | OUTPATIENT
Start: 2024-07-21 | End: 2024-07-22 | Stop reason: HOSPADM

## 2024-07-16 RX ORDER — OXYCODONE HYDROCHLORIDE 5 MG/1
5 TABLET ORAL EVERY 6 HOURS PRN
COMMUNITY

## 2024-07-16 RX ORDER — ACETAMINOPHEN 160 MG/5ML
650 SOLUTION ORAL EVERY 4 HOURS PRN
Status: DISCONTINUED | OUTPATIENT
Start: 2024-07-16 | End: 2024-07-22 | Stop reason: HOSPADM

## 2024-07-16 RX ORDER — VALPROIC ACID 250 MG/1
750 CAPSULE, LIQUID FILLED ORAL 2 TIMES DAILY
Status: DISCONTINUED | OUTPATIENT
Start: 2024-07-16 | End: 2024-07-22 | Stop reason: HOSPADM

## 2024-07-16 RX ORDER — BENZONATATE 100 MG/1
100 CAPSULE ORAL 3 TIMES DAILY
Status: DISCONTINUED | OUTPATIENT
Start: 2024-07-16 | End: 2024-07-22 | Stop reason: HOSPADM

## 2024-07-16 RX ORDER — POLYETHYLENE GLYCOL 3350, SODIUM CHLORIDE, SODIUM BICARBONATE, POTASSIUM CHLORIDE 420; 11.2; 5.72; 1.48 G/4L; G/4L; G/4L; G/4L
4000 POWDER, FOR SOLUTION ORAL ONCE
Status: COMPLETED | OUTPATIENT
Start: 2024-07-16 | End: 2024-07-16

## 2024-07-16 RX ORDER — POLYETHYLENE GLYCOL 3350 17 G/17G
17 POWDER, FOR SOLUTION ORAL DAILY PRN
Status: DISCONTINUED | OUTPATIENT
Start: 2024-07-16 | End: 2024-07-22 | Stop reason: HOSPADM

## 2024-07-16 RX ORDER — BENZONATATE 100 MG/1
100 CAPSULE ORAL 3 TIMES DAILY
COMMUNITY

## 2024-07-16 RX ORDER — CETIRIZINE HYDROCHLORIDE 10 MG/1
10 TABLET ORAL DAILY
Status: DISCONTINUED | OUTPATIENT
Start: 2024-07-16 | End: 2024-07-22 | Stop reason: HOSPADM

## 2024-07-16 RX ORDER — FERROUS SULFATE, DRIED 160(50) MG
1 TABLET, EXTENDED RELEASE ORAL 3 TIMES DAILY
Status: DISCONTINUED | OUTPATIENT
Start: 2024-07-16 | End: 2024-07-16 | Stop reason: SDUPTHER

## 2024-07-16 RX ORDER — ONDANSETRON HYDROCHLORIDE 2 MG/ML
4 INJECTION, SOLUTION INTRAVENOUS EVERY 6 HOURS PRN
Status: DISCONTINUED | OUTPATIENT
Start: 2024-07-16 | End: 2024-07-22 | Stop reason: HOSPADM

## 2024-07-16 RX ORDER — FERROUS SULFATE, DRIED 160(50) MG
1 TABLET, EXTENDED RELEASE ORAL 3 TIMES DAILY
COMMUNITY

## 2024-07-16 RX ORDER — OXYCODONE HYDROCHLORIDE 5 MG/1
10 TABLET ORAL EVERY 6 HOURS PRN
Status: DISCONTINUED | OUTPATIENT
Start: 2024-07-16 | End: 2024-07-21

## 2024-07-16 RX ORDER — ACYCLOVIR 200 MG/1
200 CAPSULE ORAL 2 TIMES DAILY
COMMUNITY

## 2024-07-16 RX ORDER — LENALIDOMIDE 25 MG/1
25 CAPSULE ORAL DAILY
Status: DISCONTINUED | OUTPATIENT
Start: 2024-07-16 | End: 2024-07-19

## 2024-07-16 RX ORDER — DEXTROSE 50 % IN WATER (D50W) INTRAVENOUS SYRINGE
12.5
Status: DISCONTINUED | OUTPATIENT
Start: 2024-07-16 | End: 2024-07-22 | Stop reason: HOSPADM

## 2024-07-16 RX ORDER — DEXTROSE 50 % IN WATER (D50W) INTRAVENOUS SYRINGE
25
Status: DISCONTINUED | OUTPATIENT
Start: 2024-07-16 | End: 2024-07-22 | Stop reason: HOSPADM

## 2024-07-16 RX ORDER — ATORVASTATIN CALCIUM 40 MG/1
40 TABLET, FILM COATED ORAL NIGHTLY
Status: DISCONTINUED | OUTPATIENT
Start: 2024-07-16 | End: 2024-07-22 | Stop reason: HOSPADM

## 2024-07-16 RX ORDER — ACYCLOVIR 200 MG/1
200 CAPSULE ORAL 2 TIMES DAILY
Status: DISCONTINUED | OUTPATIENT
Start: 2024-07-16 | End: 2024-07-22 | Stop reason: HOSPADM

## 2024-07-16 RX ORDER — CALCIUM CARBONATE 200(500)MG
2 TABLET,CHEWABLE ORAL 3 TIMES DAILY
COMMUNITY

## 2024-07-16 RX ORDER — ALBUTEROL SULFATE 0.83 MG/ML
2.5 SOLUTION RESPIRATORY (INHALATION) EVERY 2 HOUR PRN
Status: DISCONTINUED | OUTPATIENT
Start: 2024-07-16 | End: 2024-07-22 | Stop reason: HOSPADM

## 2024-07-16 RX ORDER — OXYCODONE HYDROCHLORIDE 5 MG/1
5 TABLET ORAL EVERY 6 HOURS PRN
Status: DISCONTINUED | OUTPATIENT
Start: 2024-07-16 | End: 2024-07-22 | Stop reason: HOSPADM

## 2024-07-16 RX ORDER — PANTOPRAZOLE SODIUM 40 MG/10ML
40 INJECTION, POWDER, LYOPHILIZED, FOR SOLUTION INTRAVENOUS ONCE
Status: COMPLETED | OUTPATIENT
Start: 2024-07-16 | End: 2024-07-16

## 2024-07-16 RX ORDER — DOCUSATE SODIUM 100 MG/1
100 CAPSULE, LIQUID FILLED ORAL 2 TIMES DAILY
Status: DISCONTINUED | OUTPATIENT
Start: 2024-07-16 | End: 2024-07-22 | Stop reason: HOSPADM

## 2024-07-16 RX ORDER — OXYCODONE HYDROCHLORIDE 5 MG/1
10 TABLET ORAL EVERY 6 HOURS PRN
COMMUNITY

## 2024-07-16 RX ORDER — ACETAMINOPHEN 325 MG/1
650 TABLET ORAL EVERY 4 HOURS PRN
Status: DISCONTINUED | OUTPATIENT
Start: 2024-07-16 | End: 2024-07-22 | Stop reason: HOSPADM

## 2024-07-16 RX ORDER — LENALIDOMIDE 25 MG/1
25 CAPSULE ORAL DAILY
COMMUNITY

## 2024-07-16 RX ORDER — ALBUTEROL SULFATE 0.83 MG/ML
2.5 SOLUTION RESPIRATORY (INHALATION) EVERY 4 HOURS PRN
COMMUNITY

## 2024-07-16 RX ORDER — FLUTICASONE PROPIONATE 50 MCG
2 SPRAY, SUSPENSION (ML) NASAL DAILY
Status: DISCONTINUED | OUTPATIENT
Start: 2024-07-16 | End: 2024-07-22 | Stop reason: HOSPADM

## 2024-07-16 SDOH — SOCIAL STABILITY: SOCIAL INSECURITY: ABUSE: ADULT

## 2024-07-16 SDOH — SOCIAL STABILITY: SOCIAL INSECURITY: HAVE YOU HAD ANY THOUGHTS OF HARMING ANYONE ELSE?: NO

## 2024-07-16 SDOH — SOCIAL STABILITY: SOCIAL INSECURITY: HAS ANYONE EVER THREATENED TO HURT YOUR FAMILY OR YOUR PETS?: NO

## 2024-07-16 SDOH — SOCIAL STABILITY: SOCIAL INSECURITY: DO YOU FEEL ANYONE HAS EXPLOITED OR TAKEN ADVANTAGE OF YOU FINANCIALLY OR OF YOUR PERSONAL PROPERTY?: NO

## 2024-07-16 SDOH — SOCIAL STABILITY: SOCIAL INSECURITY: ARE YOU OR HAVE YOU BEEN THREATENED OR ABUSED PHYSICALLY, EMOTIONALLY, OR SEXUALLY BY ANYONE?: NO

## 2024-07-16 SDOH — SOCIAL STABILITY: SOCIAL INSECURITY: HAVE YOU HAD THOUGHTS OF HARMING ANYONE ELSE?: NO

## 2024-07-16 SDOH — SOCIAL STABILITY: SOCIAL INSECURITY: DOES ANYONE TRY TO KEEP YOU FROM HAVING/CONTACTING OTHER FRIENDS OR DOING THINGS OUTSIDE YOUR HOME?: NO

## 2024-07-16 SDOH — SOCIAL STABILITY: SOCIAL INSECURITY: DO YOU FEEL UNSAFE GOING BACK TO THE PLACE WHERE YOU ARE LIVING?: NO

## 2024-07-16 SDOH — SOCIAL STABILITY: SOCIAL INSECURITY: WERE YOU ABLE TO COMPLETE ALL THE BEHAVIORAL HEALTH SCREENINGS?: YES

## 2024-07-16 SDOH — SOCIAL STABILITY: SOCIAL INSECURITY: ARE THERE ANY APPARENT SIGNS OF INJURIES/BEHAVIORS THAT COULD BE RELATED TO ABUSE/NEGLECT?: NO

## 2024-07-16 ASSESSMENT — ACTIVITIES OF DAILY LIVING (ADL)
PATIENT'S MEMORY ADEQUATE TO SAFELY COMPLETE DAILY ACTIVITIES?: YES
FEEDING YOURSELF: NEEDS ASSISTANCE
GROOMING: NEEDS ASSISTANCE
HEARING - RIGHT EAR: FUNCTIONAL
LACK_OF_TRANSPORTATION: NO
ASSISTIVE_DEVICE: WHEELCHAIR
DRESSING YOURSELF: NEEDS ASSISTANCE
HEARING - LEFT EAR: FUNCTIONAL
WALKS IN HOME: DEPENDENT
BATHING: NEEDS ASSISTANCE
JUDGMENT_ADEQUATE_SAFELY_COMPLETE_DAILY_ACTIVITIES: YES
TOILETING: NEEDS ASSISTANCE
ADEQUATE_TO_COMPLETE_ADL: YES

## 2024-07-16 ASSESSMENT — PATIENT HEALTH QUESTIONNAIRE - PHQ9
1. LITTLE INTEREST OR PLEASURE IN DOING THINGS: NOT AT ALL
SUM OF ALL RESPONSES TO PHQ9 QUESTIONS 1 & 2: 0
2. FEELING DOWN, DEPRESSED OR HOPELESS: NOT AT ALL

## 2024-07-16 ASSESSMENT — COGNITIVE AND FUNCTIONAL STATUS - GENERAL
PATIENT BASELINE BEDBOUND: NO
CLIMB 3 TO 5 STEPS WITH RAILING: TOTAL
HELP NEEDED FOR BATHING: A LITTLE
DRESSING REGULAR LOWER BODY CLOTHING: A LITTLE
PERSONAL GROOMING: A LOT
TOILETING: TOTAL
CLIMB 3 TO 5 STEPS WITH RAILING: TOTAL
MOVING TO AND FROM BED TO CHAIR: A LOT
DRESSING REGULAR LOWER BODY CLOTHING: A LOT
DAILY ACTIVITIY SCORE: 12
MOBILITY SCORE: 10
EATING MEALS: A LITTLE
WALKING IN HOSPITAL ROOM: A LOT
STANDING UP FROM CHAIR USING ARMS: A LOT
MOVING TO AND FROM BED TO CHAIR: A LOT
MOVING FROM LYING ON BACK TO SITTING ON SIDE OF FLAT BED WITH BEDRAILS: A LITTLE
DRESSING REGULAR UPPER BODY CLOTHING: A LOT
PERSONAL GROOMING: A LITTLE
STANDING UP FROM CHAIR USING ARMS: A LOT
EATING MEALS: A LITTLE
HELP NEEDED FOR BATHING: A LOT
MOVING FROM LYING ON BACK TO SITTING ON SIDE OF FLAT BED WITH BEDRAILS: A LOT
TURNING FROM BACK TO SIDE WHILE IN FLAT BAD: A LOT
TOILETING: A LITTLE
WALKING IN HOSPITAL ROOM: TOTAL
MOBILITY SCORE: 12
DAILY ACTIVITIY SCORE: 18
DRESSING REGULAR UPPER BODY CLOTHING: A LITTLE
TURNING FROM BACK TO SIDE WHILE IN FLAT BAD: A LOT

## 2024-07-16 ASSESSMENT — LIFESTYLE VARIABLES
SKIP TO QUESTIONS 9-10: 1
HOW OFTEN DO YOU HAVE A DRINK CONTAINING ALCOHOL: NEVER
EVER FELT BAD OR GUILTY ABOUT YOUR DRINKING: NO
HAVE YOU EVER FELT YOU SHOULD CUT DOWN ON YOUR DRINKING: NO
HOW MANY STANDARD DRINKS CONTAINING ALCOHOL DO YOU HAVE ON A TYPICAL DAY: PATIENT DOES NOT DRINK
HAVE PEOPLE ANNOYED YOU BY CRITICIZING YOUR DRINKING: NO
TOTAL SCORE: 0
HOW OFTEN DO YOU HAVE 6 OR MORE DRINKS ON ONE OCCASION: NEVER
SUBSTANCE_ABUSE_PAST_12_MONTHS: NO
AUDIT-C TOTAL SCORE: 0
EVER HAD A DRINK FIRST THING IN THE MORNING TO STEADY YOUR NERVES TO GET RID OF A HANGOVER: NO
PRESCIPTION_ABUSE_PAST_12_MONTHS: NO
AUDIT-C TOTAL SCORE: 0

## 2024-07-16 ASSESSMENT — COLUMBIA-SUICIDE SEVERITY RATING SCALE - C-SSRS
2. HAVE YOU ACTUALLY HAD ANY THOUGHTS OF KILLING YOURSELF?: NO
6. HAVE YOU EVER DONE ANYTHING, STARTED TO DO ANYTHING, OR PREPARED TO DO ANYTHING TO END YOUR LIFE?: NO
1. IN THE PAST MONTH, HAVE YOU WISHED YOU WERE DEAD OR WISHED YOU COULD GO TO SLEEP AND NOT WAKE UP?: NO

## 2024-07-16 NOTE — CONSULTS
History of Present Illness:   Ash Rodrigues is a 63 y.o. male with a PMH of HTN, HLD, DM, metastatic prostate cancer s/p radiation c/b radiation proctitis, multiple myeloma, CKD, seizures, meningioma s/p surgery, TIA, alcohol abuse, obesity who presented to the hospital with maroon-colored stools and whom we are consulted for further management.  Patient is a poor informant and unable to participate much in review of systems.  He is unable to tell me how much blood he had in his stool.  Patient currently lives at [facility].  He was sent to the ED after the staff noticed blood in his stool.  Per family member, she believes it was found this morning.  Patient denies any other GI symptoms.  He does complain of URI type symptoms (mostly congestion).  Multiple recent hospitalizations.  Last colonoscopy was in April 2024.  It showed a solitary ulcer in the rectum, diverticulosis.  He has had other flexible sigmoidoscopies this year for treatment of radiation proctitis.    Review of Systems  ROS Negative unless otherwise stated above.    Past Medical History   has a past medical history of Personal history of other diseases of the circulatory system.     Social History        Family History  family history is not on file.     Allergies  Allergies   Allergen Reactions    Amoxicillin Unknown     Per SNF paperwork    Propoxyphene Unknown     Per SNF paperwork    Zometa [Zoledronic Acid] Unknown     Per SNF paperwork       Medications  Current Outpatient Medications   Medication Instructions    acetaminophen (TYLENOL) 1,000 mg, oral, 2 times daily PRN    acetaminophen (TYLENOL) 650 mg, oral, Every 4 hours PRN    acyclovir (ZOVIRAX) 200 mg, oral, 2 times daily    albuterol 2.5 mg, nebulization, Every 4 hours PRN    amLODIPine (NORVASC) 10 mg, oral    apixaban (ELIQUIS) 2.5 mg, oral, 2 times daily    Aranesp (in polysorbate) 100 mcg, subcutaneous, Once Weekly, Sundays    atorvastatin (LIPITOR) 40 mg, oral,  Nightly    benzonatate (TESSALON) 100 mg, oral, 3 times daily, Do not crush or chew.    bisacodyl (DULCOLAX (BISACODYL)) 10 mg, rectal, Daily PRN    calcium carbonate (Tums) 200 mg calcium chewable tablet 2 tablets, oral, 3 times daily    calcium carbonate-vitamin D3 500 mg-5 mcg (200 unit) tablet 1 tablet, oral, 3 times daily    carvedilol (COREG) 12.5 mg, oral, 2 times daily    diclofenac sodium (VOLTAREN) 4 g, Topical, 4 times daily    docusate sodium (COLACE) 100 mg, oral, 2 times daily    ergocalciferol (VITAMIN D-2) 1.25 mg, oral, Once Weekly, Wednesdays    fluticasone (Flonase) 50 mcg/actuation nasal spray 2 sprays, Each Nostril, Daily, Shake gently. Before first use, prime pump. After use, clean tip and replace cap.    furosemide (LASIX) 40 mg, oral, Daily    gabapentin (NEURONTIN) 300 mg, oral, 2 times daily    lenalidomide (REVLIMID) 25 mg, oral, Daily, X 21 days. Stop date 8/2/24    loratadine (CLARITIN) 10 mg, oral, Daily    mesalamine (CANASA) 1,000 mg, rectal, Daily PRN    metFORMIN (GLUCOPHAGE) 500 mg, oral, 2 times daily    methylPREDNISolone (Medrol Dospak) 4 mg tablets Take as directed on package.    ondansetron (ZOFRAN) 8 mg, oral, Every 6 hours PRN    oxyCODONE (ROXICODONE) 5 mg, oral, Every 6 hours PRN    oxyCODONE (ROXICODONE) 10 mg, oral, Every 6 hours PRN    pantoprazole (PROTONIX) 40 mg, oral, Daily, DAILY (6 AM).    polyethylene glycol (GLYCOLAX, MIRALAX) 17 g, oral, 2 times daily    sodium chloride (Ocean) 0.65 % nasal spray 2 sprays, Each Nostril, Every 4 hours PRN    valproic acid (DEPAKENE) 750 mg, oral, 2 times daily    Xtandi 160 mg, oral, Daily        Objective   Visit Vitals  /83   Pulse 85   Temp 36.6 °C (97.9 °F)   Resp 15        General: Alert, NAD.  HEENT: AT/NC.   CV: RRR.   Resp: CTA bilaterally.   GI: Soft, NT/ND.  + Obese.  Extrem: + Edema. Pulses intact.  Skin: No Jaundice.   Neuro: No focal deficits.   Psych: Normal mood and affect.       Results from last 7 days    Lab Units 07/16/24  0920   WBC AUTO x10*3/uL 2.5*   HEMOGLOBIN g/dL 10.6*   HEMATOCRIT % 33.9*   PLATELETS AUTO x10*3/uL 93*     Results from last 7 days   Lab Units 07/16/24  0920   SODIUM mmol/L 135*   POTASSIUM mmol/L 4.0   CHLORIDE mmol/L 102   CO2 mmol/L 26   BUN mg/dL 11   CREATININE mg/dL 1.12   CALCIUM mg/dL 7.9*   PROTEIN TOTAL g/dL 5.9*   BILIRUBIN TOTAL mg/dL 0.4   ALK PHOS U/L 83   ALT U/L 5*   AST U/L 10   GLUCOSE mg/dL 104*         ASSESSMENT/PLAN:  Ash Rodrigues is a 63 y.o. male with a PMH of HTN, HLD, DM, metastatic prostate cancer s/p radiation c/b radiation proctitis, multiple myeloma, CKD, seizures, meningioma s/p surgery, TIA, alcohol abuse, obesity who presented to the hospital with maroon-colored stools and whom we are consulted for further management.      Patient is afebrile, HDS.  Normal lactate.  Hemoglobin: 10.6 (?above baseline).  No elevation in BUN/creatinine ratio.  INR: 1.3.  COVID + (symptoms: congestion).       -Clear liquid diet, NPO after midnight.  -Please prep patient tonight.   -Hold Eliquis (please confirm he is on this/find out reason why).  -No indication for stool occult blood test (this is a colon cancer screening test).  -Colonoscopy tomorrow.   -Goals of care discussion.    Emmanuel Moore DO  GI Attending

## 2024-07-16 NOTE — ED PROVIDER NOTES
HPI   Chief Complaint   Patient presents with    Rectal Bleeding       This is a 63-year-old male with past medical history of diabetes, hypertension, hyperlipidemia, prostate cancer status post radiation, CKD, meningioma, presenting the emergency department concern for GI bleed.  Patient's care facility noticed blood in his stool overnight so sent him to the emergency department to be evaluated.  Patient is on apixaban for blood clot prevention per paperwork.  Patient endorses a mild cough though this is in the setting of recently being COVID-positive.  He otherwise denies shortness of breath, abdominal pain, back pain, urinary symptoms, fever/chills, nausea/vomiting, chest pain.        History provided by:  Patient   used: No                        Jazmine Coma Scale Score: 15                     Patient History   Past Medical History:   Diagnosis Date    Personal history of other diseases of the circulatory system     History of hypertension     Past Surgical History:   Procedure Laterality Date    BACK SURGERY  10/26/2015    Back Surgery     No family history on file.  Social History     Tobacco Use    Smoking status: Not on file    Smokeless tobacco: Not on file   Substance Use Topics    Alcohol use: Not on file    Drug use: Not on file       Physical Exam   ED Triage Vitals [07/16/24 0905]   Temperature Heart Rate Respirations BP   36.6 °C (97.9 °F) 87 20 140/88      Pulse Ox Temp src Heart Rate Source Patient Position   98 % -- -- --      BP Location FiO2 (%)     -- --       Physical Exam  GEN:  chronically ill appearing, no acute distress  CVS/CHEST: reg rate, nl rhythm, no murmurs/gallops/rubs  PULM: CTAB b/l no wheezes, crackles, or rhonchi   GI: NT/ND, no masses or organomegaly, soft, no guarding  BACK: no CVA tenderness  : Skin tear to right buttock, external exam without hemorrhoids, GABRIELLE with maroon stool  NEURO:  no focal deficits, no facial asymmetry, moving all  extremities  PSYCH: AAOx3 answers questions appropriately  ED Course & MDM   ED Course as of 07/16/24 1510   Tue Jul 16, 2024   1018 Patient's lab work with a leukopenia.  Hemoglobin 10.6 which appears improved compared to 2 months ago.  With his rectal exam concerning for GI bleed I do feel he would benefit from admission and further management.  Vitals remained stable in the emergency department.  He was discussed with Dr. Regan who will admit the patient to the hospital.  Patient given Protonix in the emergency department. [DE]      ED Course User Index  [DE] Kenan Oneal MD         Diagnoses as of 07/16/24 1510   Gastrointestinal hemorrhage, unspecified gastrointestinal hemorrhage type       Medical Decision Making  This is a 63-year-old male with past medical history of diabetes, hypertension, hyperlipidemia, prostate cancer status post radiation, CKD, meningioma, presenting the emergency department concern for GI bleed.  Patient stable upon presentation to the emergency department, no acute distress and vitals are unremarkable.  On exam patient's abdomen is nontender, he is in no acute distress and is answering questions appropriately.  His rectal exam performed with nursing chaperone at bedside does show maroon stool concerning for GI bleed.  He is on apixaban for blood clot prevention per his paperwork.  Low concern for high-volume bleed given color of stool as well as stable vital signs.  No indication for reversal.  Cancer, diverticulosis, polyps, PUD all considered.     Procedure  Procedures     Kenan Oneal MD  07/16/24 1510

## 2024-07-16 NOTE — H&P
History Of Present Illness  Ash Rodrigues is a 63 y.o. male with past medical history significant for diabetes mellitus, HTN, HLD, prostate cancer s/p radiation, multiple myeloma, CKD, history of seizures, and meningioma who presents to the ED for concerns for a GI bleed.  Currently resides at [facility].  Patient's care facility noticed blood in the stool overnight, so he was sent to the [facility] ED for further evaluation.  Patient is on Eliquis twice daily.  Also admits to right-sided abdominal pain earlier, however denies any abdominal pain currently.  Patient does admit to a mild cough, however does state he was recently COVID-positive but is unsure when he tested positive.  Of note, patient recently seen on 6/19 at [facility] for fever and concern for pneumonia.  Blood cultures did show staph epidermis, patient did receive IV antibiotics.  Patient follows with oncology for prostate cancer with metastatic cancer involving the bones, currently being treated.  Patient was also having hypercalcemia while in the hospital, was given bisphosphonates and IV fluids and had resultant hypocalcemia.  Patient currently denies any complaints or pains in the ED, is only stating his nose feels dry and he is having a hard time breathing through it.  Denies headaches, dizziness, chest pains, shortness of breath, abdominal pain or nausea, pain with bowel movements, changes in urination, or fever/chills.  States he does not ambulate very much.    ED course: On arrival to the ED, patient was afebrile and hemodynamically stable with SpO2 98% on room air.  Glucose 104, sodium 135, calcium 7.9, ALT 5, lactate 1.1.  INR 1.3, PT 14.1.  WBC 2.5.  Hemoglobin 10.6/hematocrit 33.9.  Platelets 93. Per ED exam, rectal exam did show maroon stool concerning for GI bleed.    Diagnostic colonoscopy at [facility], 4/26/24: Single solitary ulcer in the rectum, diverticulosis in sigmoid colon and descending colon, no  specimens collected.    Admitting provider is Dr. Regan     Past Medical History  Past Medical History:   Diagnosis Date    Personal history of other diseases of the circulatory system     History of hypertension     Surgical History  Past Surgical History:   Procedure Laterality Date    BACK SURGERY  10/26/2015    Back Surgery     Social History  He has no history on file for tobacco use, alcohol use, and drug use.    Family History  No family history on file.     Allergies  Amoxicillin, Propoxyphene, and Zometa [zoledronic acid]    Review of Systems  10 point review system negative except as noted above in HPI    Physical Exam  Constitutional:       Appearance: He is obese. He is ill-appearing.      Comments: Patient does seem very lethargic but will respond appropriately to verbal stimuli.   HENT:      Head: Normocephalic and atraumatic.      Mouth/Throat:      Mouth: Mucous membranes are moist.      Pharynx: Oropharynx is clear.   Eyes:      Extraocular Movements: Extraocular movements intact.      Conjunctiva/sclera: Conjunctivae normal.      Pupils: Pupils are equal, round, and reactive to light.   Cardiovascular:      Rate and Rhythm: Normal rate and regular rhythm.      Pulses: Normal pulses.      Heart sounds: Normal heart sounds.   Pulmonary:      Effort: Pulmonary effort is normal. No respiratory distress.      Breath sounds: Normal breath sounds.   Abdominal:      General: Bowel sounds are normal.      Palpations: Abdomen is soft.      Tenderness: There is no abdominal tenderness.   Genitourinary:     Comments: (ED provider notes skin tear to right buttock and GABRIELLE with maroon-colored stool.)  Musculoskeletal:         General: No swelling or tenderness.      Comments: Moving all extremities appropriately   Skin:     General: Skin is warm and dry.   Neurological:      General: No focal deficit present.      Mental Status: He is alert and oriented to person, place, and time.   Psychiatric:         Mood  "and Affect: Mood normal.         Behavior: Behavior normal.       Last Recorded Vitals  Blood pressure 155/83, pulse 85, temperature 36.6 °C (97.9 °F), resp. rate 15, height 2.007 m (6' 7\"), weight 150 kg (330 lb), SpO2 97%.    Relevant Results  Results for orders placed or performed during the hospital encounter of 07/16/24 (from the past 24 hour(s))   CBC and Auto Differential   Result Value Ref Range    WBC 2.5 (L) 4.4 - 11.3 x10*3/uL    nRBC 0.0 0.0 - 0.0 /100 WBCs    RBC 3.84 (L) 4.50 - 5.90 x10*6/uL    Hemoglobin 10.6 (L) 13.5 - 17.5 g/dL    Hematocrit 33.9 (L) 41.0 - 52.0 %    MCV 88 80 - 100 fL    MCH 27.6 26.0 - 34.0 pg    MCHC 31.3 (L) 32.0 - 36.0 g/dL    RDW 16.3 (H) 11.5 - 14.5 %    Platelets 93 (L) 150 - 450 x10*3/uL    Neutrophils % 50.5 40.0 - 80.0 %    Immature Granulocytes %, Automated 2.0 (H) 0.0 - 0.9 %    Lymphocytes % 9.3 13.0 - 44.0 %    Monocytes % 25.2 2.0 - 10.0 %    Eosinophils % 10.6 0.0 - 6.0 %    Basophils % 2.4 0.0 - 2.0 %    Neutrophils Absolute 1.24 1.20 - 7.70 x10*3/uL    Immature Granulocytes Absolute, Automated 0.05 0.00 - 0.70 x10*3/uL    Lymphocytes Absolute 0.23 (L) 1.20 - 4.80 x10*3/uL    Monocytes Absolute 0.62 0.10 - 1.00 x10*3/uL    Eosinophils Absolute 0.26 0.00 - 0.70 x10*3/uL    Basophils Absolute 0.06 0.00 - 0.10 x10*3/uL   Comprehensive metabolic panel   Result Value Ref Range    Glucose 104 (H) 74 - 99 mg/dL    Sodium 135 (L) 136 - 145 mmol/L    Potassium 4.0 3.5 - 5.3 mmol/L    Chloride 102 98 - 107 mmol/L    Bicarbonate 26 21 - 32 mmol/L    Anion Gap 11 10 - 20 mmol/L    Urea Nitrogen 11 6 - 23 mg/dL    Creatinine 1.12 0.50 - 1.30 mg/dL    eGFR 74 >60 mL/min/1.73m*2    Calcium 7.9 (L) 8.6 - 10.3 mg/dL    Albumin 3.4 3.4 - 5.0 g/dL    Alkaline Phosphatase 83 33 - 136 U/L    Total Protein 5.9 (L) 6.4 - 8.2 g/dL    AST 10 9 - 39 U/L    Bilirubin, Total 0.4 0.0 - 1.2 mg/dL    ALT 5 (L) 10 - 52 U/L   Lactate   Result Value Ref Range    Lactate 1.1 0.4 - 2.0 mmol/L   Type " and Screen   Result Value Ref Range    ABO TYPE O     Rh TYPE POS     ANTIBODY SCREEN POS    Coagulation Screen   Result Value Ref Range    Protime 14.1 (H) 9.8 - 12.8 seconds    INR 1.3 (H) 0.9 - 1.1    aPTT 37 27 - 38 seconds      Assessment/Plan   Principal Problem:    Gastrointestinal hemorrhage, unspecified gastrointestinal hemorrhage type    Ash Rodrigues is a 63 y.o. male presents to the ED for concerns for a GI bleed.  Currently resides at [facility].  Patient's care facility noticed blood in the stool overnight, so he was sent to the [facility] ED for further evaluation.  Patient is on Eliquis twice daily.  Also admits to right-sided abdominal pain earlier, however denies any abdominal pain currently.  Patient does admit to a mild cough, however does state he was recently COVID-positive but is unsure when he tested positive. Patient follows with oncology for prostate cancer with metastatic cancer involving the bones, currently being treated with Xtandi and Revlimid.  Patient was also having hypercalcemia while in the hospital, was given bisphosphonates and IV fluids and had resultant hypocalcemia.  Patient currently denies any complaints or pains in the ED. Per ED exam, rectal exam did show maroon stool concerning for GI bleed.  Patient's most recent colonoscopy was 4/26/2024, showed single solitary ulcer in the rectum and diverticulosis in the sigmoid colon and descending colon.    CODE STATUS: Full code    #Gastrointestinal hemorrhage  #Anemia, chronic  Admit for observation with telemetry monitor  GI consult  Currently denies any tenderness to palpation of abdomen  Hemoglobin 10.6/hematocrit 33.9 today, hemoglobin 11.7/hematocrit 38.1 on 7/12  Monitor H&H, trend  Stool occult blood test ordered  Urinalysis ordered  IV Protonix twice daily  PT/OT for evaluation and treatment  Q 4 vitals  CBC, BMP in the a.m.  Start on clear liquid diet, advance as tolerated  Pain medicine, Zofran as  needed    #Recent COVID positive (unsure when positive test was)  Oxygen as needed, albuterol as needed  Chest x-ray ordered    #Diabetes mellitus  Sliding scale insulin  ACHS Accu-Cheks    #Leukopenia, chronic  #Thrombocytopenia, chronic  #Hypocalcemia  Monitor with a.m. labs  Continue with calcium supplementation    Continue home medications as appropriate    Chronic conditions:  DM, HTN, HLD, prostate cancer, MM, CKD, seizures, meningioma    #DVT prophylaxis  Ambulation, SCDs  Hold home Eliquis secondary to GI bleed       I spent 45 minutes in the professional and overall care of this patient.    Dagoberto Rivas PA-C

## 2024-07-16 NOTE — PROGRESS NOTES
Social work consult placed for positive medical risk screen. SW reviewed pt's chart and communicated with TCC. No SW needs foreseen at this time. SW signing off; available upon request.

## 2024-07-16 NOTE — ED TRIAGE NOTES
Patient BIB EMS from [facility] for c/o rectal bleeding. Per EMS SNF RN stated that pt began to have rectal bleeding overnight. Patient states right sided abd pain. Patient also covid positive, unsure of when he tested positive.

## 2024-07-17 ENCOUNTER — APPOINTMENT (OUTPATIENT)
Dept: GASTROENTEROLOGY | Facility: HOSPITAL | Age: 64
DRG: 393 | End: 2024-07-17
Payer: MEDICARE

## 2024-07-17 ENCOUNTER — ANESTHESIA EVENT (OUTPATIENT)
Dept: GASTROENTEROLOGY | Facility: HOSPITAL | Age: 64
End: 2024-07-17
Payer: MEDICARE

## 2024-07-17 ENCOUNTER — APPOINTMENT (OUTPATIENT)
Dept: RADIOLOGY | Facility: HOSPITAL | Age: 64
DRG: 393 | End: 2024-07-17
Payer: MEDICARE

## 2024-07-17 ENCOUNTER — ANESTHESIA (OUTPATIENT)
Dept: GASTROENTEROLOGY | Facility: HOSPITAL | Age: 64
End: 2024-07-17
Payer: MEDICARE

## 2024-07-17 PROBLEM — E66.9 OBESITY: Status: ACTIVE | Noted: 2024-07-17

## 2024-07-17 PROBLEM — R56.9 SEIZURES (MULTI): Status: ACTIVE | Noted: 2024-07-17

## 2024-07-17 PROBLEM — I10 HTN (HYPERTENSION): Status: ACTIVE | Noted: 2024-07-17

## 2024-07-17 PROBLEM — J18.9 PNEUMONIA: Status: ACTIVE | Noted: 2024-07-17

## 2024-07-17 PROBLEM — Z99.81 HISTORY OF HOME OXYGEN THERAPY: Status: ACTIVE | Noted: 2024-07-17

## 2024-07-17 PROBLEM — E11.9 DIABETES MELLITUS, TYPE 2 (MULTI): Status: ACTIVE | Noted: 2024-07-17

## 2024-07-17 PROBLEM — E78.5 HYPERLIPIDEMIA: Status: ACTIVE | Noted: 2024-07-17

## 2024-07-17 LAB
ANION GAP SERPL CALC-SCNC: 11 MMOL/L (ref 10–20)
APPEARANCE UR: CLEAR
APPEARANCE UR: CLEAR
BACTERIA #/AREA URNS AUTO: ABNORMAL /HPF
BILIRUB UR STRIP.AUTO-MCNC: NEGATIVE MG/DL
BILIRUB UR STRIP.AUTO-MCNC: NEGATIVE MG/DL
BUN SERPL-MCNC: 12 MG/DL (ref 6–23)
CALCIUM SERPL-MCNC: 7.3 MG/DL (ref 8.6–10.3)
CHLORIDE SERPL-SCNC: 103 MMOL/L (ref 98–107)
CO2 SERPL-SCNC: 27 MMOL/L (ref 21–32)
COLOR UR: ABNORMAL
COLOR UR: YELLOW
CREAT SERPL-MCNC: 1.24 MG/DL (ref 0.5–1.3)
EGFRCR SERPLBLD CKD-EPI 2021: 65 ML/MIN/1.73M*2
ERYTHROCYTE [DISTWIDTH] IN BLOOD BY AUTOMATED COUNT: 16.6 % (ref 11.5–14.5)
GLUCOSE BLD MANUAL STRIP-MCNC: 101 MG/DL (ref 74–99)
GLUCOSE BLD MANUAL STRIP-MCNC: 103 MG/DL (ref 74–99)
GLUCOSE BLD MANUAL STRIP-MCNC: 117 MG/DL (ref 74–99)
GLUCOSE BLD MANUAL STRIP-MCNC: 121 MG/DL (ref 74–99)
GLUCOSE BLD MANUAL STRIP-MCNC: 126 MG/DL (ref 74–99)
GLUCOSE SERPL-MCNC: 114 MG/DL (ref 74–99)
GLUCOSE UR STRIP.AUTO-MCNC: NORMAL MG/DL
GLUCOSE UR STRIP.AUTO-MCNC: NORMAL MG/DL
HCT VFR BLD AUTO: 33.8 % (ref 41–52)
HGB BLD-MCNC: 10.8 G/DL (ref 13.5–17.5)
HOLD SPECIMEN: NORMAL
HOLD SPECIMEN: NORMAL
HYALINE CASTS #/AREA URNS AUTO: ABNORMAL /LPF
KETONES UR STRIP.AUTO-MCNC: ABNORMAL MG/DL
KETONES UR STRIP.AUTO-MCNC: ABNORMAL MG/DL
LEUKOCYTE ESTERASE UR QL STRIP.AUTO: NEGATIVE
LEUKOCYTE ESTERASE UR QL STRIP.AUTO: NEGATIVE
MCH RBC QN AUTO: 27.7 PG (ref 26–34)
MCHC RBC AUTO-ENTMCNC: 32 G/DL (ref 32–36)
MCV RBC AUTO: 87 FL (ref 80–100)
NITRITE UR QL STRIP.AUTO: NEGATIVE
NITRITE UR QL STRIP.AUTO: NEGATIVE
NRBC BLD-RTO: 0 /100 WBCS (ref 0–0)
PH UR STRIP.AUTO: 7 [PH]
PH UR STRIP.AUTO: 7 [PH]
PLATELET # BLD AUTO: 73 X10*3/UL (ref 150–450)
POTASSIUM SERPL-SCNC: 3.9 MMOL/L (ref 3.5–5.3)
PROT UR STRIP.AUTO-MCNC: NEGATIVE MG/DL
PROT UR STRIP.AUTO-MCNC: NEGATIVE MG/DL
RBC # BLD AUTO: 3.9 X10*6/UL (ref 4.5–5.9)
RBC # UR STRIP.AUTO: ABNORMAL /UL
RBC # UR STRIP.AUTO: ABNORMAL /UL
RBC #/AREA URNS AUTO: ABNORMAL /HPF
RBC #/AREA URNS AUTO: NORMAL /HPF
SODIUM SERPL-SCNC: 137 MMOL/L (ref 136–145)
SP GR UR STRIP.AUTO: 1.01
SP GR UR STRIP.AUTO: 1.02
UROBILINOGEN UR STRIP.AUTO-MCNC: ABNORMAL MG/DL
UROBILINOGEN UR STRIP.AUTO-MCNC: NORMAL MG/DL
WBC # BLD AUTO: 2.9 X10*3/UL (ref 4.4–11.3)
WBC #/AREA URNS AUTO: ABNORMAL /HPF
WBC #/AREA URNS AUTO: NORMAL /HPF

## 2024-07-17 PROCEDURE — 45380 COLONOSCOPY AND BIOPSY: CPT | Performed by: STUDENT IN AN ORGANIZED HEALTH CARE EDUCATION/TRAINING PROGRAM

## 2024-07-17 PROCEDURE — C2617 STENT, NON-COR, TEM W/O DEL: HCPCS

## 2024-07-17 PROCEDURE — 2500000002 HC RX 250 W HCPCS SELF ADMINISTERED DRUGS (ALT 637 FOR MEDICARE OP, ALT 636 FOR OP/ED): Performed by: PHYSICIAN ASSISTANT

## 2024-07-17 PROCEDURE — 87040 BLOOD CULTURE FOR BACTERIA: CPT | Mod: PARLAB | Performed by: INTERNAL MEDICINE

## 2024-07-17 PROCEDURE — 88305 TISSUE EXAM BY PATHOLOGIST: CPT | Mod: TC,PARLAB | Performed by: STUDENT IN AN ORGANIZED HEALTH CARE EDUCATION/TRAINING PROGRAM

## 2024-07-17 PROCEDURE — XW0H886 INTRODUCTION OF MINERAL-BASED TOPICAL HEMOSTATIC AGENT INTO LOWER GI, VIA NATURAL OR ARTIFICIAL OPENING ENDOSCOPIC, NEW TECHNOLOGY GROUP 6: ICD-10-PCS | Performed by: STUDENT IN AN ORGANIZED HEALTH CARE EDUCATION/TRAINING PROGRAM

## 2024-07-17 PROCEDURE — 2500000001 HC RX 250 WO HCPCS SELF ADMINISTERED DRUGS (ALT 637 FOR MEDICARE OP): Performed by: PHYSICIAN ASSISTANT

## 2024-07-17 PROCEDURE — 2500000004 HC RX 250 GENERAL PHARMACY W/ HCPCS (ALT 636 FOR OP/ED): Performed by: ANESTHESIOLOGIST ASSISTANT

## 2024-07-17 PROCEDURE — 3700000001 HC GENERAL ANESTHESIA TIME - INITIAL BASE CHARGE

## 2024-07-17 PROCEDURE — 86905 BLOOD TYPING RBC ANTIGENS: CPT

## 2024-07-17 PROCEDURE — 36415 COLL VENOUS BLD VENIPUNCTURE: CPT | Performed by: PHYSICIAN ASSISTANT

## 2024-07-17 PROCEDURE — A45378 PR COLONOSCOPY,DIAGNOSTIC: Performed by: ANESTHESIOLOGIST ASSISTANT

## 2024-07-17 PROCEDURE — 7100000002 HC RECOVERY ROOM TIME - EACH INCREMENTAL 1 MINUTE

## 2024-07-17 PROCEDURE — G0378 HOSPITAL OBSERVATION PER HR: HCPCS

## 2024-07-17 PROCEDURE — 2780000003 HC OR 278 NO HCPCS

## 2024-07-17 PROCEDURE — 0DBM8ZX EXCISION OF DESCENDING COLON, VIA NATURAL OR ARTIFICIAL OPENING ENDOSCOPIC, DIAGNOSTIC: ICD-10-PCS | Performed by: STUDENT IN AN ORGANIZED HEALTH CARE EDUCATION/TRAINING PROGRAM

## 2024-07-17 PROCEDURE — 82947 ASSAY GLUCOSE BLOOD QUANT: CPT

## 2024-07-17 PROCEDURE — 3700000002 HC GENERAL ANESTHESIA TIME - EACH INCREMENTAL 1 MINUTE

## 2024-07-17 PROCEDURE — 71045 X-RAY EXAM CHEST 1 VIEW: CPT

## 2024-07-17 PROCEDURE — 45382 COLONOSCOPY W/CONTROL BLEED: CPT | Performed by: STUDENT IN AN ORGANIZED HEALTH CARE EDUCATION/TRAINING PROGRAM

## 2024-07-17 PROCEDURE — 86971 RBC PRETX INCUBATJ W/ENZYMES: CPT

## 2024-07-17 PROCEDURE — 7100000001 HC RECOVERY ROOM TIME - INITIAL BASE CHARGE

## 2024-07-17 PROCEDURE — 2500000004 HC RX 250 GENERAL PHARMACY W/ HCPCS (ALT 636 FOR OP/ED): Performed by: PHYSICIAN ASSISTANT

## 2024-07-17 PROCEDURE — C1889 IMPLANT/INSERT DEVICE, NOC: HCPCS

## 2024-07-17 PROCEDURE — 2720000007 HC OR 272 NO HCPCS

## 2024-07-17 PROCEDURE — 36415 COLL VENOUS BLD VENIPUNCTURE: CPT | Performed by: INTERNAL MEDICINE

## 2024-07-17 PROCEDURE — 86870 RBC ANTIBODY IDENTIFICATION: CPT

## 2024-07-17 PROCEDURE — 80048 BASIC METABOLIC PNL TOTAL CA: CPT | Performed by: PHYSICIAN ASSISTANT

## 2024-07-17 PROCEDURE — 2500000002 HC RX 250 W HCPCS SELF ADMINISTERED DRUGS (ALT 637 FOR MEDICARE OP, ALT 636 FOR OP/ED)

## 2024-07-17 PROCEDURE — 86880 COOMBS TEST DIRECT: CPT

## 2024-07-17 PROCEDURE — 81001 URINALYSIS AUTO W/SCOPE: CPT | Performed by: PHYSICIAN ASSISTANT

## 2024-07-17 PROCEDURE — 45382 COLONOSCOPY W/CONTROL BLEED: CPT

## 2024-07-17 PROCEDURE — C9113 INJ PANTOPRAZOLE SODIUM, VIA: HCPCS | Performed by: PHYSICIAN ASSISTANT

## 2024-07-17 PROCEDURE — A45378 PR COLONOSCOPY,DIAGNOSTIC: Performed by: ANESTHESIOLOGY

## 2024-07-17 PROCEDURE — 85027 COMPLETE CBC AUTOMATED: CPT | Performed by: PHYSICIAN ASSISTANT

## 2024-07-17 PROCEDURE — 71045 X-RAY EXAM CHEST 1 VIEW: CPT | Performed by: RADIOLOGY

## 2024-07-17 PROCEDURE — 2500000005 HC RX 250 GENERAL PHARMACY W/O HCPCS: Performed by: ANESTHESIOLOGIST ASSISTANT

## 2024-07-17 RX ORDER — IPRATROPIUM BROMIDE AND ALBUTEROL SULFATE 2.5; .5 MG/3ML; MG/3ML
3 SOLUTION RESPIRATORY (INHALATION) ONCE
Status: COMPLETED | OUTPATIENT
Start: 2024-07-17 | End: 2024-07-17

## 2024-07-17 RX ORDER — LIDOCAINE HCL/PF 100 MG/5ML
SYRINGE (ML) INTRAVENOUS AS NEEDED
Status: DISCONTINUED | OUTPATIENT
Start: 2024-07-17 | End: 2024-07-17

## 2024-07-17 RX ORDER — PROPOFOL 10 MG/ML
INJECTION, EMULSION INTRAVENOUS CONTINUOUS PRN
Status: DISCONTINUED | OUTPATIENT
Start: 2024-07-17 | End: 2024-07-17

## 2024-07-17 RX ORDER — IPRATROPIUM BROMIDE AND ALBUTEROL SULFATE 2.5; .5 MG/3ML; MG/3ML
SOLUTION RESPIRATORY (INHALATION)
Status: COMPLETED
Start: 2024-07-17 | End: 2024-07-17

## 2024-07-17 SDOH — HEALTH STABILITY: MENTAL HEALTH: CURRENT SMOKER: 0

## 2024-07-17 ASSESSMENT — COGNITIVE AND FUNCTIONAL STATUS - GENERAL
TURNING FROM BACK TO SIDE WHILE IN FLAT BAD: TOTAL
STANDING UP FROM CHAIR USING ARMS: TOTAL
STANDING UP FROM CHAIR USING ARMS: TOTAL
DRESSING REGULAR LOWER BODY CLOTHING: TOTAL
CLIMB 3 TO 5 STEPS WITH RAILING: TOTAL
DRESSING REGULAR UPPER BODY CLOTHING: TOTAL
MOVING FROM LYING ON BACK TO SITTING ON SIDE OF FLAT BED WITH BEDRAILS: TOTAL
TOILETING: TOTAL
DRESSING REGULAR LOWER BODY CLOTHING: TOTAL
DRESSING REGULAR UPPER BODY CLOTHING: TOTAL
TURNING FROM BACK TO SIDE WHILE IN FLAT BAD: TOTAL
MOBILITY SCORE: 6
WALKING IN HOSPITAL ROOM: TOTAL
HELP NEEDED FOR BATHING: TOTAL
DAILY ACTIVITIY SCORE: 7
CLIMB 3 TO 5 STEPS WITH RAILING: TOTAL
MOBILITY SCORE: 6
MOVING TO AND FROM BED TO CHAIR: TOTAL
MOVING FROM LYING ON BACK TO SITTING ON SIDE OF FLAT BED WITH BEDRAILS: TOTAL
MOBILITY SCORE: 6
STANDING UP FROM CHAIR USING ARMS: TOTAL
TOILETING: TOTAL
MOVING TO AND FROM BED TO CHAIR: TOTAL
MOVING FROM LYING ON BACK TO SITTING ON SIDE OF FLAT BED WITH BEDRAILS: TOTAL
PERSONAL GROOMING: TOTAL
TURNING FROM BACK TO SIDE WHILE IN FLAT BAD: TOTAL
EATING MEALS: A LOT
TOILETING: TOTAL
MOVING TO AND FROM BED TO CHAIR: TOTAL
EATING MEALS: A LOT
DRESSING REGULAR LOWER BODY CLOTHING: TOTAL
WALKING IN HOSPITAL ROOM: TOTAL
WALKING IN HOSPITAL ROOM: TOTAL
PERSONAL GROOMING: TOTAL
DAILY ACTIVITIY SCORE: 7
PERSONAL GROOMING: TOTAL
CLIMB 3 TO 5 STEPS WITH RAILING: TOTAL
HELP NEEDED FOR BATHING: TOTAL
DRESSING REGULAR UPPER BODY CLOTHING: TOTAL

## 2024-07-17 ASSESSMENT — PAIN SCALES - GENERAL
PAINLEVEL_OUTOF10: 0 - NO PAIN

## 2024-07-17 ASSESSMENT — PAIN - FUNCTIONAL ASSESSMENT
PAIN_FUNCTIONAL_ASSESSMENT: 0-10
PAIN_FUNCTIONAL_ASSESSMENT: 0-10

## 2024-07-17 NOTE — ANESTHESIA POSTPROCEDURE EVALUATION
Patient: Ash Rodrigues    Procedure Summary       Date: 07/17/24 Room / Location: [facility]    Anesthesia Start: 1101 Anesthesia Stop: 1200    Procedure: COLONOSCOPY Diagnosis: Gastrointestinal hemorrhage, unspecified gastrointestinal hemorrhage type    Scheduled Providers:  Responsible Provider: Mike Burnett MD    Anesthesia Type: MAC ASA Status: 4            Anesthesia Type: MAC    Vitals Value Taken Time   /80 07/17/24 1200   Temp 36.3 °C (97.3 °F) 07/17/24 1155   Pulse 96 07/17/24 1214   Resp 18 07/17/24 1200   SpO2 97 % 07/17/24 1214   Vitals shown include unfiled device data.    Anesthesia Post Evaluation    Patient location during evaluation: PACU  Patient participation: complete - patient participated  Level of consciousness: confused  Pain management: adequate  Airway patency: patent  Cardiovascular status: acceptable  Respiratory status: acceptable  Hydration status: acceptable  Postoperative Nausea and Vomiting: none        No notable events documented.

## 2024-07-17 NOTE — PROGRESS NOTES
Ash Rodrigues is a 63 y.o. male on day 0 of admission presenting with Gastrointestinal hemorrhage, unspecified gastrointestinal hemorrhage type.      Subjective   63 y.o. male with past medical history significant for diabetes mellitus, HTN, HLD, prostate cancer s/p radiation, multiple myeloma, CKD, history of seizures, and meningioma who presents to the ED for concerns for a GI bleed.  Currently resides at [facility].  Patient's care facility noticed blood in the stool overnight, so he was sent to the [facility] ED for further evaluation.  Patient is on Eliquis twice daily.  Also admits to right-sided abdominal pain earlier, however denies any abdominal pain currently.  Patient does admit to a mild cough, however does state he was recently COVID-positive but is unsure when he tested positive.  Of note, patient recently seen on 6/19 at [facility] for fever and concern for pneumonia.  Blood cultures did show staph epidermis, patient did receive IV antibiotics.  Patient follows with oncology for prostate cancer with metastatic cancer involving the bones, currently being treated.  Patient was also having hypercalcemia while in the hospital, was given bisphosphonates and IV fluids and had resultant hypocalcemia.  Patient currently denies any complaints or pains in the ED, is only stating his nose feels dry and he is having a hard time breathing through it.  Denies headaches, dizziness, chest pains, shortness of breath, abdominal pain or nausea, pain with bowel movements, changes in urination, or fever/chills.  States he does not ambulate very much.        Objective     Last Recorded Vitals  /63 (BP Location: Right arm, Patient Position: Lying)   Pulse 100   Temp (!) 38.7 °C (101.7 °F) (Temporal)   Resp 18   Wt 150 kg (330 lb)   SpO2 95%   Intake/Output last 3 Shifts:    Intake/Output Summary (Last 24 hours) at 7/17/2024 1927  Last data filed at 7/17/2024 1630  Gross per 24 hour    Intake 200 ml   Output 580 ml   Net -380 ml       Admission Weight  Weight: 150 kg (330 lb) (07/16/24 0905)    Daily Weight  07/16/24 : 150 kg (330 lb)    Image Results  Electrocardiogram, 12-lead PRN ACS symptoms  Sinus rhythm  Abnormal R-wave progression, early transition  Colonoscopy Diagnostic  Table formatting from the original result was not included.  Findings/Impression  Radiation proctitis + large rectal ulcer with small overlying clot and few   areas suspicious for vessels s/p gold probe coagulation and 2 unsuccessful   clips.  Area continued to ooze with 1 area with pulsation s/p repeat gold   probe coagulation and Hemospray.  Erythematous and edematous mucosa in the distal descending colon/sigmoid   colon s/p biopsies.  Lipoma in the ascending colon.  Fair prep (R colon).    Recommendation  Await pathology results   Consider permanently holding anticoagulation  Clear liquid diet       Indication  Gastrointestinal hemorrhage, unspecified gastrointestinal hemorrhage type    Medications  See Anesthesia Record.     Preprocedure  A history and physical has been performed, and patient medication   allergies have been reviewed. The patient's tolerance of previous   anesthesia has been reviewed. The risks and benefits of the procedure and   the sedation options and risks were discussed with the patient. All   questions were answered and informed consent obtained.    Details of the Procedure  The patient underwent monitored anesthesia care, which was administered by   an anesthesia professional. The patient's blood pressure, ECG, ETCO2,   heart rate, level of consciousness, oxygen and respirations were monitored   throughout the procedure. A digital rectal exam was performed. The scope   was introduced through the anus and advanced to the cecum. The patient   experienced no blood loss. The procedure was not difficult. The patient   tolerated the procedure well. There were no apparent adverse events.      Events  Procedure Events   Event Event Time   ENDO SCOPE IN TIME 7/17/2024 11:09 AM   ENDO CECUM REACHED 7/17/2024 11:18 AM   ENDO SCOPE OUT TIME 7/17/2024 11:47 AM     Specimens  ID Type Source Tests Collected by Time   1 : COLD BX Tissue COLON - DESCENDING BIOPSY SURGICAL PATHOLOGY EXAM   Mohinder Moore DO 7/17/2024 1124     Procedure Location  [facility]  [address] [facility] [address]  [phone]    Referring Provider  Mohinder Moore DO    Procedure Provider  Mohinder Moore DO    Results from last 7 days   Lab Units 07/17/24  0613   WBC AUTO x10*3/uL 2.9*   HEMOGLOBIN g/dL 10.8*   HEMATOCRIT % 33.8*   PLATELETS AUTO x10*3/uL 73*      Results from last 7 days   Lab Units 07/17/24  0613   SODIUM mmol/L 137   POTASSIUM mmol/L 3.9   CHLORIDE mmol/L 103   CO2 mmol/L 27   BUN mg/dL 12   CREATININE mg/dL 1.24   GLUCOSE mg/dL 114*   CALCIUM mg/dL 7.3*      Review of systems weakness lethargy deconditioning no fever    Physical Exam  Lungs are diminished but clear heart has regular rate and rhythm abdomen is soft is not distended or firm  Relevant Results               Assessment/Plan                  Principal Problem:    Gastrointestinal hemorrhage, unspecified gastrointestinal hemorrhage type  Active Problems:    Seizures (Multi)    HTN (hypertension)    Hyperlipidemia    Diabetes mellitus, type 2 (Multi)    Pneumonia    History of home oxygen therapy    Obesity    Gastrointestinal hemorrhage  #Anemia, chronic  Admit for observation with telemetry monitor  GI consult  Currently denies any tenderness to palpation of abdomen  Hemoglobin 10.6/hematocrit 33.9 today, hemoglobin 11.7/hematocrit 38.1 on 7/12  Monitor H&H, trend  Stool occult blood test ordered  Urinalysis ordered  IV Protonix twice daily  PT/OT for evaluation and treatment  Q 4 vitals  CBC, BMP in the a.m.  Start on clear liquid diet, advance as tolerated  Pain medicine, Zofran as  needed                 Mendel Regan, DO

## 2024-07-17 NOTE — PROGRESS NOTES
Physical Therapy                 Therapy Communication Note    Patient Name: Ash Rodrigues  MRN: [id number]  Today's Date: 7/17/2024     Discipline: Physical Therapy    Missed Visit Reason: Missed Visit Reason:  (plan for colonoscopy)

## 2024-07-17 NOTE — PROGRESS NOTES
Occupational Therapy                 Therapy Communication Note    Patient Name: Ash Rodrigues  MRN: [id number]  Today's Date: 7/17/2024     Discipline: Occupational Therapy    Missed Visit Reason: plan for colonscopy.

## 2024-07-17 NOTE — ANESTHESIA PREPROCEDURE EVALUATION
Patient: Ash Rodrigues    Procedure Information       Date/Time: 07/17/24 1100    Procedure: COLONOSCOPY    Location: [facility]            Relevant Problems   Anesthesia (within normal limits)      Cardiac   (+) HTN (hypertension)   (+) Hyperlipidemia      Pulmonary   (+) History of home oxygen therapy   (+) Pneumonia      Neuro   (+) Seizures (Multi)      GI   (+) Gastrointestinal hemorrhage, unspecified gastrointestinal hemorrhage type      Endocrine   (+) Diabetes mellitus, type 2 (Multi)   (+) Obesity      Musculoskeletal   (+) Osteoarthritis of both hips       Clinical information reviewed:    Allergies  Meds   Med Hx  Surg Hx   Fam Hx          NPO Detail:  No data recorded     Physical Exam    Airway  Mallampati: unable to assess     Cardiovascular - normal exam     Dental    Pulmonary - normal exam     Abdominal   (+) obese             Anesthesia Plan    History of general anesthesia?: yes  History of complications of general anesthesia?: no    ASA 4     MAC     The patient is not a current smoker.    intravenous induction   Anesthetic plan and risks discussed with patient.    Plan discussed with CAA.       room air

## 2024-07-17 NOTE — PROGRESS NOTES
"   07/17/24 1105   Discharge Planning   Living Arrangements   (LTC)   Support Systems Family members   Assistance Needed LTC   Type of Residence Nursing home/residential care   Who is requesting discharge planning? Provider   Home or Post Acute Services Post acute facilities (Rehab/SNF/etc)   Type of Post Acute Facility Services Long term care   Expected Discharge Disposition SNF   Does the patient need discharge transport arranged? Yes   RoundTrip coordination needed? Yes   Has discharge transport been arranged? No     Attempted bedside visit for assessment and dispo planning. Patient at Colonoscopy. RN states patient from [facility]. Chart reviewed, LTC at [facility]. DSC tasked to send return referral. Awaiting confirmation that patient from [facility] LTC/Bedhold. Need FOC from patient once return from procedure. TCC to continue to follow for discharge planning.     UPDATE 1133: From [facility] \"He is LTC Private pay. I would like to get some skilled nursing If you could send PT/OT eval and current so I can try to obtain Auth before he is ready to come back we would appreciate it. \" PT/OT ordered, awaiting recs.     AZIZA PEREZ, RN TCC  "

## 2024-07-18 LAB
ANION GAP SERPL CALC-SCNC: 13 MMOL/L (ref 10–20)
ATRIAL RATE: 88 BPM
BB ANTIBODY IDENTIFICATION: NORMAL
BUN SERPL-MCNC: 11 MG/DL (ref 6–23)
CALCIUM SERPL-MCNC: 6.9 MG/DL (ref 8.6–10.3)
CASE #: NORMAL
CHLORIDE SERPL-SCNC: 102 MMOL/L (ref 98–107)
CO2 SERPL-SCNC: 22 MMOL/L (ref 21–32)
CREAT SERPL-MCNC: 1.26 MG/DL (ref 0.5–1.3)
EGFRCR SERPLBLD CKD-EPI 2021: 64 ML/MIN/1.73M*2
ERYTHROCYTE [DISTWIDTH] IN BLOOD BY AUTOMATED COUNT: 16.5 % (ref 11.5–14.5)
GLUCOSE BLD MANUAL STRIP-MCNC: 112 MG/DL (ref 74–99)
GLUCOSE BLD MANUAL STRIP-MCNC: 151 MG/DL (ref 74–99)
GLUCOSE BLD MANUAL STRIP-MCNC: 155 MG/DL (ref 74–99)
GLUCOSE BLD MANUAL STRIP-MCNC: 95 MG/DL (ref 74–99)
GLUCOSE SERPL-MCNC: 116 MG/DL (ref 74–99)
HCT VFR BLD AUTO: 32.7 % (ref 41–52)
HGB BLD-MCNC: 10.5 G/DL (ref 13.5–17.5)
HOLD SPECIMEN: NORMAL
MAGNESIUM SERPL-MCNC: 1.54 MG/DL (ref 1.6–2.4)
MCH RBC QN AUTO: 27.1 PG (ref 26–34)
MCHC RBC AUTO-ENTMCNC: 32.1 G/DL (ref 32–36)
MCV RBC AUTO: 85 FL (ref 80–100)
NRBC BLD-RTO: 0 /100 WBCS (ref 0–0)
P AXIS: 77 DEGREES
PLATELET # BLD AUTO: 72 X10*3/UL (ref 150–450)
POTASSIUM SERPL-SCNC: 3.3 MMOL/L (ref 3.5–5.3)
PR INTERVAL: 183 MS
Q ONSET: 252 MS
QRS COUNT: 14 BEATS
QRS DURATION: 103 MS
QT INTERVAL: 342 MS
QTC CALCULATION(BAZETT): 412 MS
QTC FREDERICIA: 387 MS
R AXIS: 70 DEGREES
RBC # BLD AUTO: 3.87 X10*6/UL (ref 4.5–5.9)
SODIUM SERPL-SCNC: 134 MMOL/L (ref 136–145)
T AXIS: -48 DEGREES
T OFFSET: 423 MS
VENTRICULAR RATE: 87 BPM
WBC # BLD AUTO: 3.4 X10*3/UL (ref 4.4–11.3)

## 2024-07-18 PROCEDURE — C9113 INJ PANTOPRAZOLE SODIUM, VIA: HCPCS | Performed by: PHYSICIAN ASSISTANT

## 2024-07-18 PROCEDURE — 97161 PT EVAL LOW COMPLEX 20 MIN: CPT | Mod: GP

## 2024-07-18 PROCEDURE — 2500000002 HC RX 250 W HCPCS SELF ADMINISTERED DRUGS (ALT 637 FOR MEDICARE OP, ALT 636 FOR OP/ED): Performed by: PHYSICIAN ASSISTANT

## 2024-07-18 PROCEDURE — 83735 ASSAY OF MAGNESIUM: CPT | Performed by: PHYSICIAN ASSISTANT

## 2024-07-18 PROCEDURE — 36415 COLL VENOUS BLD VENIPUNCTURE: CPT | Performed by: PHYSICIAN ASSISTANT

## 2024-07-18 PROCEDURE — 2500000004 HC RX 250 GENERAL PHARMACY W/ HCPCS (ALT 636 FOR OP/ED): Performed by: PHYSICIAN ASSISTANT

## 2024-07-18 PROCEDURE — 99232 SBSQ HOSP IP/OBS MODERATE 35: CPT | Performed by: STUDENT IN AN ORGANIZED HEALTH CARE EDUCATION/TRAINING PROGRAM

## 2024-07-18 PROCEDURE — 85027 COMPLETE CBC AUTOMATED: CPT | Performed by: PHYSICIAN ASSISTANT

## 2024-07-18 PROCEDURE — G0378 HOSPITAL OBSERVATION PER HR: HCPCS

## 2024-07-18 PROCEDURE — 82947 ASSAY GLUCOSE BLOOD QUANT: CPT

## 2024-07-18 PROCEDURE — 94640 AIRWAY INHALATION TREATMENT: CPT

## 2024-07-18 PROCEDURE — 82374 ASSAY BLOOD CARBON DIOXIDE: CPT | Performed by: PHYSICIAN ASSISTANT

## 2024-07-18 PROCEDURE — 2500000002 HC RX 250 W HCPCS SELF ADMINISTERED DRUGS (ALT 637 FOR MEDICARE OP, ALT 636 FOR OP/ED): Performed by: INTERNAL MEDICINE

## 2024-07-18 PROCEDURE — 2500000001 HC RX 250 WO HCPCS SELF ADMINISTERED DRUGS (ALT 637 FOR MEDICARE OP): Performed by: PHYSICIAN ASSISTANT

## 2024-07-18 PROCEDURE — 97165 OT EVAL LOW COMPLEX 30 MIN: CPT | Mod: GO

## 2024-07-18 RX ORDER — MAGNESIUM SULFATE HEPTAHYDRATE 40 MG/ML
2 INJECTION, SOLUTION INTRAVENOUS ONCE
Status: COMPLETED | OUTPATIENT
Start: 2024-07-18 | End: 2024-07-18

## 2024-07-18 RX ORDER — GUAIFENESIN 600 MG/1
600 TABLET, EXTENDED RELEASE ORAL 2 TIMES DAILY PRN
Status: DISCONTINUED | OUTPATIENT
Start: 2024-07-18 | End: 2024-07-22 | Stop reason: HOSPADM

## 2024-07-18 RX ORDER — SODIUM CHLORIDE 9 MG/ML
125 INJECTION, SOLUTION INTRAVENOUS CONTINUOUS
Status: ACTIVE | OUTPATIENT
Start: 2024-07-18 | End: 2024-07-18

## 2024-07-18 RX ORDER — POTASSIUM CHLORIDE 14.9 MG/ML
20 INJECTION INTRAVENOUS
Status: COMPLETED | OUTPATIENT
Start: 2024-07-18 | End: 2024-07-18

## 2024-07-18 RX ORDER — TAMSULOSIN HYDROCHLORIDE 0.4 MG/1
0.4 CAPSULE ORAL DAILY
Status: DISCONTINUED | OUTPATIENT
Start: 2024-07-18 | End: 2024-07-22 | Stop reason: HOSPADM

## 2024-07-18 ASSESSMENT — COGNITIVE AND FUNCTIONAL STATUS - GENERAL
MOBILITY SCORE: 6
DRESSING REGULAR UPPER BODY CLOTHING: TOTAL
EATING MEALS: A LOT
STANDING UP FROM CHAIR USING ARMS: TOTAL
TOILETING: TOTAL
TOILETING: TOTAL
DRESSING REGULAR LOWER BODY CLOTHING: TOTAL
CLIMB 3 TO 5 STEPS WITH RAILING: TOTAL
PERSONAL GROOMING: TOTAL
CLIMB 3 TO 5 STEPS WITH RAILING: TOTAL
TURNING FROM BACK TO SIDE WHILE IN FLAT BAD: TOTAL
TURNING FROM BACK TO SIDE WHILE IN FLAT BAD: TOTAL
MOVING FROM LYING ON BACK TO SITTING ON SIDE OF FLAT BED WITH BEDRAILS: TOTAL
PERSONAL GROOMING: TOTAL
MOVING FROM LYING ON BACK TO SITTING ON SIDE OF FLAT BED WITH BEDRAILS: TOTAL
MOVING TO AND FROM BED TO CHAIR: TOTAL
EATING MEALS: A LOT
WALKING IN HOSPITAL ROOM: TOTAL
STANDING UP FROM CHAIR USING ARMS: TOTAL
HELP NEEDED FOR BATHING: TOTAL
DRESSING REGULAR LOWER BODY CLOTHING: TOTAL
DAILY ACTIVITIY SCORE: 7
WALKING IN HOSPITAL ROOM: TOTAL
HELP NEEDED FOR BATHING: TOTAL

## 2024-07-18 ASSESSMENT — PAIN SCALES - GENERAL
PAINLEVEL_OUTOF10: 10 - WORST POSSIBLE PAIN
PAINLEVEL_OUTOF10: 0 - NO PAIN
PAINLEVEL_OUTOF10: 10 - WORST POSSIBLE PAIN

## 2024-07-18 ASSESSMENT — PAIN DESCRIPTION - ORIENTATION: ORIENTATION: RIGHT

## 2024-07-18 ASSESSMENT — PAIN DESCRIPTION - LOCATION: LOCATION: HIP

## 2024-07-18 ASSESSMENT — ACTIVITIES OF DAILY LIVING (ADL): BATHING_ASSISTANCE: TOTAL

## 2024-07-18 NOTE — SIGNIFICANT EVENT
This [facility] house officer was contacted by THAIS Lopez reporting that the patient had urinary retention ~600 cc of urine in his bladder.  Patient was straight cathed for urinary retention yesterday for 580 cc of retained urine.  Given this is the second episode of urinary retention, a Moya catheter is placed.  Patient will be started on Flomax.  Of note, patient has been recurrently using Afrin nasal spray for nosebleeds at his nursing home.  Patient advised to no longer continue using this given the possibility of urinary retention.

## 2024-07-18 NOTE — PROGRESS NOTES
Occupational Therapy    Evaluation    Patient Name: Ash Rodrigues  MRN: [id number]  Today's Date: 7/18/2024  Time Calculation  Start Time: 1004  Stop Time: 1028  Time Calculation (min): 24 min  603/603-A    Assessment  IP OT Assessment  OT Assessment: pt showing significant decline form 3 months ago, due to patient inability at this time to provide history it is unclear as to most recent prior level of mobiity and attention.  End of Session Communication: PCT/NA/CTA  End of Session Patient Position: Bed, 3 rail up, Alarm on    Plan:  Treatment Interventions: Endurance training, UE strengthening/ROM, ADL retraining, Visual perceptual retraining, Functional transfer training, Patient/family training, Cognitive reorientation  OT Frequency: 2 times per week (trial 2 visits this week excluding eval to determine if pt has increased awareness)  OT - OK to Discharge: Yes (to next level of care)    Subjective     Current Problem:  1. Gastrointestinal hemorrhage, unspecified gastrointestinal hemorrhage type  Colonoscopy Diagnostic    Colonoscopy Diagnostic    Surgical Pathology Exam    Surgical Pathology Exam          General:  General  Reason for Referral: OT to eval and treat for adls and safety assessment 63 y.o. male with past medical history significant for diabetes mellitus, HTN, HLD, prostate cancer s/p radiation, multiple myeloma, CKD, history of seizures, and meningioma who presents to the ED for concerns for a GI bleed. 7/17/24 colonoscopy: Findings/Impression  Radiation proctitis + large rectal ulcer with small overlying clot and few   areas suspicious for vessels s/p gold probe coagulation and 2 unsuccessful   clips.  Referred By: Jass  Caregiver Feedback: Per conference w/ RN patient stable to participate in therapy  Co-Treatment: PT  Co-Treatment Reason: to maximize pt safety & mobility  Prior to Session Communication: PCT/NA/CTA (pca shared history she revecieved previously from wife)  Patient Position  "Received: Bed, 3 rail up, Alarm on  General Comment: leathargic, cues to open eyes, intermittent arousal/responsiveness, generally passive during evaluation; able to provide name/; reports rt knee \"is messed up\" when rom performed, unable to elaborate    Precautions:  Precautions Comment: (P) fall risk, alarm, external cath., COVID+    Pain:  Pain Assessment  Pain Assessment:  (pt reported that his right leg is messed up when it was tested for rom.  pt did not give a score.)    Objective     Cognition:  Overall Cognitive Status: Impaired (able to verify name and , however, pt in and out attention.  required frequent repetition)  Attention: Exceptions to WFL  Processing Speed: Delayed     Home Living:  Type of Home:  (pt has been living at snf the past 3 months. started snf due to issues with r knee was living with wife in house prior to May.)     Prior Function:  Level of [place]:  (patient unable to provide prior level of function due to lethargy/decreased arousal, per EMR review admitted from [facility];per pca pt was ambulatory w/ ww 3months ago, le instaiblity/ rt knee giving out has limited mobility)    ADL:  Eating Assistance: Maximal  Grooming Assistance: Maximal  Bathing Assistance: Total  UE Dressing Assistance: Maximal  LE Dressing Assistance: Total  Toileting Assistance with Device: Maximal (x3)    Bed Mobility/Transfers:   Bed Mobility  Bed Mobility: Yes  Bed Mobility 1  Bed Mobility 1:  (rolling side to side for hygiene, max 3 with use of bedrails.)  Transfers  Transfer:  (unable/unsafe at this time due to chronic le weakness & acute decreased arousal)      Sensation:  Light Touch:  (difficult to assess due to pt lack of arousal at times and attention)    Strength:  Strength Comments: pt has tone, but bue are heavy and edemitous- pt lacs strneght in bue. for functional tasks. (hand grasp 50 -75%% fist closure)    Coordination:  Movements are Fluid and Coordinated: No  Finger to " Target: Impaired     Hand Function:  Hand Function  Gross Grasp: Impaired  Coordination: Impaired    Extremities: RUE   RUE :  (prom wfl,) and [place]:  (prom wfl.)    Outcome Measures: [facility] Daily Activity  Putting on and taking off regular lower body clothing: Total  Bathing (including washing, rinsing, drying): Total  Putting on and taking off regular upper body clothing: Total  Toileting, which includes using toilet, bedpan or urinal: Total  Taking care of personal grooming such as brushing teeth: Total  Eating Meals: A lot  Daily Activity - Total Score: 7     EDUCATION:     Education Documentation  Body Mechanics, taught by Hyacinth Diaz, OT at 7/18/2024  1:42 PM.  Learner: Patient  Readiness: Acceptance  Method: Explanation  Response: Needs Reinforcement    Precautions, taught by Hyacinth Diaz OT at 7/18/2024  1:42 PM.  Learner: Patient  Readiness: Acceptance  Method: Explanation  Response: Needs Reinforcement    ADL Training, taught by Hyacinth Diaz OT at 7/18/2024  1:42 PM.  Learner: Patient  Readiness: Acceptance  Method: Explanation  Response: Needs Reinforcement    Education Comments  No comments found.        Goals:   Encounter Problems       Encounter Problems (Active)       Dressing Upper Extremities       STG - Patient will dress upper body with mod asssit       Start:  07/18/24    Expected End:  07/25/24               Eating       STG - Patient feeds self 20 % of meal  with set up and finger food.drink        Start:  07/18/24    Expected End:  07/25/24               Functional Balance       STG - Maintains static sitting balance with upper extremity support with mod x 2        Start:  07/18/24    Expected End:  07/25/24       INTERVENTIONS:  1. Practice sitting on the edge of a bed/mat with minimal support.  2. Educate patient about maintining total hip precautions while maintaining balance.  3. Educate patient about pressure relief.  4. Educate patient about use of assistive device.             Grooming       STG - Patient completes grooming with set up and mod assist        Start:  07/18/24    Expected End:  07/25/24

## 2024-07-18 NOTE — CARE PLAN
The patient's goals for the shift include      The clinical goals for the shift include comfort and safety    Over the shift, the patient did not make progress toward the following goals. Barriers to progression include s/p anesthesia/colonoscopy. Recommendations to address these barriers include frequent repositioning/mouth care.

## 2024-07-18 NOTE — PROGRESS NOTES
Physical Therapy    Physical Therapy Evaluation    Patient Name: Ash Rodrigues  MRN: [id number]  Today's Date: 7/18/2024   Time Calculation  Start Time: 1004  Stop Time: 1029  Time Calculation (min): 25 min  603/603-A    Assessment/Plan   PT Assessment  PT Assessment Results: Decreased strength, Decreased range of motion, Decreased endurance, Impaired balance, Decreased mobility, Decreased safety awareness, Pain  Rehab Prognosis: Fair  Evaluation/Treatment Tolerance: Patient limited by fatigue, Patient limited by pain  End of Session Communication: PCT/NA/CTA  Assessment Comment: Continued skilled PT intervention indicated to facilitate increased strength, balance &functional mobility  End of Session Patient Position: Bed, 3 rail up, Alarm on (call light in reach, hob elevated, heels offloaded)  IP OR SWING BED PT PLAN  Inpatient or Swing Bed: Inpatient  PT Plan  Treatment/Interventions: Bed mobility, Balance training, Range of motion, Therapeutic exercise, Therapeutic activity  PT Plan: Ongoing PT  PT Frequency: 2 times per week (trial as able to actively participate & progress)  PT Discharge Recommendations: Moderate intensity level of continued care  PT - OK to Discharge: Yes (to next level of care when cleared by  medical team)    Subjective     Current Problem:  1. Gastrointestinal hemorrhage, unspecified gastrointestinal hemorrhage type  Colonoscopy Diagnostic    Colonoscopy Diagnostic    Surgical Pathology Exam    Surgical Pathology Exam        Patient Active Problem List   Diagnosis    Arthropathy of shoulder region    Brain injury without coma (Multi)    Sprain of right rotator cuff capsule    Infraspinatus tendinitis, right    Osteoarthritis of both hips    Long term (current) use of opiate analgesic    Gastrointestinal hemorrhage, unspecified gastrointestinal hemorrhage type    Seizures (Multi)    HTN (hypertension)    Hyperlipidemia    Diabetes mellitus, type 2 (Multi)    Pneumonia    History of home  "oxygen therapy    Obesity       General Visit Information:  General  Reason for Referral: PT eval & treat/impaired mobility; 63 y.o. male with past medical history significant for diabetes mellitus, HTN, HLD, prostate cancer s/p radiation, multiple myeloma, CKD, history of seizures, and meningioma who presents to the ED for concerns for a GI bleed. 24 colonoscopy: Findings/Impression  Radiation proctitis + large rectal ulcer with small overlying clot and few   areas suspicious for vessels s/p gold probe coagulation and 2 unsuccessful   clips.  Referred By: Jass  Caregiver Feedback: Per conference w/ RN patient stable to participate in therapy  Co-Treatment: OT  Co-Treatment Reason: to maximize pt safety & mobility  Patient Position Received: Bed, 3 rail up, Alarm on  General Comment: leathargic, cues to open eyes, intermittent arousal/responsiveness, generally passive during evaluation; able to provide name/; reports rt knee \"is messed up\" when rom performed, unable to elaborate    Home Living:  Home Living  Home Living Comments: patient unable to provide prior level of function due to lethargy/decreased arousal, per EMR review admitted [facility]; per conference w/ PCA who spoke w/ patient's spouse, pt was ambulatory w/ ww 3months ago, le instaiblity/ rt knee giving out has limited mobility  Precautions:  Precautions  Precautions Comment: fall, alarm, purewick, seizures, +covid/droplet +, bone mets, scd's  Pain:  Pain Assessment  Pain Assessment:  (pain indicated w/ rom of le's rle > lle pain, not able to rate)    Functional Assessments:     Bed Mobility  Bed Mobility:  (max assist x3 rolling rt/lt manual guide to reach for opposite rail to assist in mobility. max assist x1-2 to maintain side lying position during hygienel; unable to attempt sitting due to low level arousal & impaired participation in mobility)  Transfers  Transfer:  (unable/unsafe at this time due to chronic le weakness & " "acute decreased arousal)  Ambulation/Gait Training  Ambulation/Gait Training Performed:  (nonambulatory [id number])          Extremity/Trunk Assessments:        RLE   RLE :  (hip/knee flexion prom in supine ~30deg, c/o notable pain limit \"that knee is messed up\" ankle df prom to neutral, no active movement noted/unable to attempt antigravity movement to observe strength)  LLE   LLE :  (hip/knee flexion prom in supine ~60deg,  tight end feel; ankle df prom to neutral, no active movement noted/unable to attempt antigravity movement to observe strength)    Outcome Measures:     [facility] Basic Mobility  Turning from your back to your side while in a flat bed without using bedrails: Total  Moving from lying on your back to sitting on the side of a flat bed without using bedrails: Total  Moving to and from bed to chair (including a wheelchair): Total  Standing up from a chair using your arms (e.g. wheelchair or bedside chair): Total  To walk in hospital room: Total  Climbing 3-5 steps with railing: Total  Basic Mobility - Total Score: 6   Goals:  Encounter Problems       Encounter Problems (Active)       PT Problem       STG - Pt will transition supine <> sitting with mod assist x2  (Progressing)       Start:  07/18/24    Expected End:  08/01/24            Pt will maintain sitting supported balance at edge of bed >=5minutes w/ mod assist x1-2 (Progressing)       Start:  07/18/24    Expected End:  08/01/24            STG - Pt will perform a B LE ther ex program of 2-3 sets of 10  (Progressing)       Start:  07/18/24    Expected End:  08/01/24               Pain - Adult            Education Documentation  Mobility Training, taught by Gillian Ruano, PT at 7/18/2024 11:34 AM.  Learner: Patient  Readiness: Acceptance  Method: Explanation  Response: Needs Reinforcement  Comment: safety, activity progression           "

## 2024-07-18 NOTE — PROGRESS NOTES
Subjective: Patient is more alert today, albeit still confused.  No bloody bowel movements.  He underwent colonoscopy, yesterday.  It showed radiation proctitis + large rectal ulcer with small overlying clot and few areas suspicious for vessels s/p gold probe coagulation and 2 unsuccessful clips. Area continued to ooze with 1 area with pulsation s/p repeat gold probe coagulation and Hemospray.  Hemoglobin has remained stable.      Review of Systems  ROS Negative unless otherwise stated above.    Allergies  Allergies   Allergen Reactions    Amoxicillin Unknown     Per SNF paperwork    Propoxyphene Unknown     Per SNF paperwork    Zometa [Zoledronic Acid] Unknown     Per SNF paperwork       Medications  Current Outpatient Medications   Medication Instructions    acetaminophen (TYLENOL) 1,000 mg, oral, 2 times daily PRN    acetaminophen (TYLENOL) 650 mg, oral, Every 4 hours PRN    acyclovir (ZOVIRAX) 200 mg, oral, 2 times daily    albuterol 2.5 mg, nebulization, Every 4 hours PRN    amLODIPine (NORVASC) 10 mg, oral    apixaban (ELIQUIS) 2.5 mg, oral, 2 times daily    Aranesp (in polysorbate) 100 mcg, subcutaneous, Once Weekly, Sundays    atorvastatin (LIPITOR) 40 mg, oral, Nightly    benzonatate (TESSALON) 100 mg, oral, 3 times daily, Do not crush or chew.    bisacodyl (DULCOLAX (BISACODYL)) 10 mg, rectal, Daily PRN    calcium carbonate (Tums) 200 mg calcium chewable tablet 2 tablets, oral, 3 times daily    calcium carbonate-vitamin D3 500 mg-5 mcg (200 unit) tablet 1 tablet, oral, 3 times daily    carvedilol (COREG) 12.5 mg, oral, 2 times daily    diclofenac sodium (VOLTAREN) 4 g, Topical, 4 times daily    docusate sodium (COLACE) 100 mg, oral, 2 times daily    ergocalciferol (VITAMIN D-2) 1.25 mg, oral, Once Weekly, Wednesdays    fluticasone (Flonase) 50 mcg/actuation nasal spray 2 sprays, Each Nostril, Daily, Shake gently. Before first use, prime pump. After use, clean tip and replace cap.    furosemide (LASIX) 40  mg, oral, Daily    gabapentin (NEURONTIN) 300 mg, oral, 2 times daily    lenalidomide (REVLIMID) 25 mg, oral, Daily, X 21 days. Stop date 8/2/24    loratadine (CLARITIN) 10 mg, oral, Daily    mesalamine (CANASA) 1,000 mg, rectal, Daily PRN    metFORMIN (GLUCOPHAGE) 500 mg, oral, 2 times daily    methylPREDNISolone (Medrol Dospak) 4 mg tablets Take as directed on package.    ondansetron (ZOFRAN) 8 mg, oral, Every 6 hours PRN    oxyCODONE (ROXICODONE) 5 mg, oral, Every 6 hours PRN    oxyCODONE (ROXICODONE) 10 mg, oral, Every 6 hours PRN    pantoprazole (PROTONIX) 40 mg, oral, Daily, DAILY (6 AM).    polyethylene glycol (GLYCOLAX, MIRALAX) 17 g, oral, 2 times daily    sodium chloride (Ocean) 0.65 % nasal spray 2 sprays, Each Nostril, Every 4 hours PRN    valproic acid (DEPAKENE) 750 mg, oral, 2 times daily    Xtandi 160 mg, oral, Daily        PHYSICAL EXAM:  Visit Vitals  /70   Pulse 89   Temp 36.6 °C (97.9 °F)   Resp 18        General: Alert, NAD.  HEENT: AT/NC.   Skin: No Jaundice.   Neuro: No focal deficits.   Psych: Normal mood and affect.       Results from last 7 days   Lab Units 07/18/24  0726 07/17/24 0613 07/16/24 2028 07/16/24  0920   WBC AUTO x10*3/uL 3.4* 2.9*  --  2.5*   HEMOGLOBIN g/dL 10.5* 10.8* 11.1* 10.6*   HEMATOCRIT % 32.7* 33.8* 34.1* 33.9*   PLATELETS AUTO x10*3/uL 72* 73*  --  93*     Results from last 7 days   Lab Units 07/18/24  0726 07/17/24  0613 07/16/24  0920   SODIUM mmol/L 134* 137 135*   POTASSIUM mmol/L 3.3* 3.9 4.0   CHLORIDE mmol/L 102 103 102   CO2 mmol/L 22 27 26   BUN mg/dL 11 12 11   CREATININE mg/dL 1.26 1.24 1.12   CALCIUM mg/dL 6.9* 7.3* 7.9*   PROTEIN TOTAL g/dL  --   --  5.9*   BILIRUBIN TOTAL mg/dL  --   --  0.4   ALK PHOS U/L  --   --  83   ALT U/L  --   --  5*   AST U/L  --   --  10   GLUCOSE mg/dL 116* 114* 104*         ASSESSMENT/PLAN:  Ash Rodrigues is a 63 y.o. male with a PMH of HTN, HLD, DM, metastatic prostate cancer s/p radiation c/b radiation  proctitis, multiple myeloma, CKD, seizures, meningioma s/p surgery, TIA, alcohol abuse, obesity who presented to the hospital with maroon-colored stools and whom we are consulted for further management.       Patient is afebrile, HDS.  Normal lactate.  Hemoglobin: 10.5, stable.   No elevation in BUN/creatinine ratio.  INR: 1.3.  COVID + (symptoms: congestion).     Colonoscopy 7/17/2024: radiation proctitis + large rectal ulcer with small overlying clot and few areas suspicious for vessels s/p gold probe coagulation and 2 unsuccessful clips. Area continued to ooze with 1 area with pulsation s/p repeat gold probe coagulation and Hemospray.        -Avoid rectal stimulation (i.e. enemas).  -Continue to trend CBC, avoid iatrogenic anemia.  -Please figure out whether patient is on anticoagulation (and diagnosis).  Would continue to hold, if able.  -Goals of care discussion.      Emmanuel Moore DO  GI Attending

## 2024-07-19 LAB
ANION GAP SERPL CALC-SCNC: 13 MMOL/L (ref 10–20)
BUN SERPL-MCNC: 9 MG/DL (ref 6–23)
CALCIUM SERPL-MCNC: 6.7 MG/DL (ref 8.6–10.3)
CHLORIDE SERPL-SCNC: 99 MMOL/L (ref 98–107)
CO2 SERPL-SCNC: 23 MMOL/L (ref 21–32)
CREAT SERPL-MCNC: 1.22 MG/DL (ref 0.5–1.3)
EGFRCR SERPLBLD CKD-EPI 2021: 67 ML/MIN/1.73M*2
ERYTHROCYTE [DISTWIDTH] IN BLOOD BY AUTOMATED COUNT: 15.9 % (ref 11.5–14.5)
GLUCOSE BLD MANUAL STRIP-MCNC: 101 MG/DL (ref 74–99)
GLUCOSE BLD MANUAL STRIP-MCNC: 106 MG/DL (ref 74–99)
GLUCOSE BLD MANUAL STRIP-MCNC: 106 MG/DL (ref 74–99)
GLUCOSE BLD MANUAL STRIP-MCNC: 98 MG/DL (ref 74–99)
GLUCOSE SERPL-MCNC: 112 MG/DL (ref 74–99)
HCT VFR BLD AUTO: 30.1 % (ref 41–52)
HGB BLD-MCNC: 9.7 G/DL (ref 13.5–17.5)
HOLD SPECIMEN: NORMAL
MAGNESIUM SERPL-MCNC: 1.6 MG/DL (ref 1.6–2.4)
MCH RBC QN AUTO: 27.4 PG (ref 26–34)
MCHC RBC AUTO-ENTMCNC: 32.2 G/DL (ref 32–36)
MCV RBC AUTO: 85 FL (ref 80–100)
NRBC BLD-RTO: 0 /100 WBCS (ref 0–0)
PLATELET # BLD AUTO: 75 X10*3/UL (ref 150–450)
POTASSIUM SERPL-SCNC: 3.3 MMOL/L (ref 3.5–5.3)
RBC # BLD AUTO: 3.54 X10*6/UL (ref 4.5–5.9)
SODIUM SERPL-SCNC: 132 MMOL/L (ref 136–145)
WBC # BLD AUTO: 3.2 X10*3/UL (ref 4.4–11.3)

## 2024-07-19 PROCEDURE — 83735 ASSAY OF MAGNESIUM: CPT | Performed by: PHYSICIAN ASSISTANT

## 2024-07-19 PROCEDURE — 2500000004 HC RX 250 GENERAL PHARMACY W/ HCPCS (ALT 636 FOR OP/ED): Performed by: PHYSICIAN ASSISTANT

## 2024-07-19 PROCEDURE — C9113 INJ PANTOPRAZOLE SODIUM, VIA: HCPCS | Performed by: PHYSICIAN ASSISTANT

## 2024-07-19 PROCEDURE — 82947 ASSAY GLUCOSE BLOOD QUANT: CPT

## 2024-07-19 PROCEDURE — 85027 COMPLETE CBC AUTOMATED: CPT | Performed by: PHYSICIAN ASSISTANT

## 2024-07-19 PROCEDURE — G0378 HOSPITAL OBSERVATION PER HR: HCPCS

## 2024-07-19 PROCEDURE — 99233 SBSQ HOSP IP/OBS HIGH 50: CPT | Performed by: NURSE PRACTITIONER

## 2024-07-19 PROCEDURE — 36415 COLL VENOUS BLD VENIPUNCTURE: CPT | Performed by: PHYSICIAN ASSISTANT

## 2024-07-19 PROCEDURE — 2500000001 HC RX 250 WO HCPCS SELF ADMINISTERED DRUGS (ALT 637 FOR MEDICARE OP): Performed by: PHYSICIAN ASSISTANT

## 2024-07-19 PROCEDURE — 2500000002 HC RX 250 W HCPCS SELF ADMINISTERED DRUGS (ALT 637 FOR MEDICARE OP, ALT 636 FOR OP/ED): Performed by: PHYSICIAN ASSISTANT

## 2024-07-19 PROCEDURE — 80048 BASIC METABOLIC PNL TOTAL CA: CPT | Performed by: PHYSICIAN ASSISTANT

## 2024-07-19 RX ORDER — LENALIDOMIDE 25 MG/1
25 CAPSULE ORAL DAILY
Status: DISCONTINUED | OUTPATIENT
Start: 2024-07-20 | End: 2024-07-22 | Stop reason: HOSPADM

## 2024-07-19 RX ORDER — POTASSIUM CHLORIDE 14.9 MG/ML
20 INJECTION INTRAVENOUS
Status: COMPLETED | OUTPATIENT
Start: 2024-07-19 | End: 2024-07-19

## 2024-07-19 ASSESSMENT — PAIN SCALES - GENERAL: PAINLEVEL_OUTOF10: 10 - WORST POSSIBLE PAIN

## 2024-07-19 ASSESSMENT — COGNITIVE AND FUNCTIONAL STATUS - GENERAL
DRESSING REGULAR LOWER BODY CLOTHING: TOTAL
CLIMB 3 TO 5 STEPS WITH RAILING: TOTAL
DRESSING REGULAR UPPER BODY CLOTHING: TOTAL
TOILETING: TOTAL
DAILY ACTIVITIY SCORE: 7
WALKING IN HOSPITAL ROOM: TOTAL
MOBILITY SCORE: 6
EATING MEALS: A LOT
MOVING TO AND FROM BED TO CHAIR: TOTAL
PERSONAL GROOMING: TOTAL
HELP NEEDED FOR BATHING: TOTAL
STANDING UP FROM CHAIR USING ARMS: TOTAL
TURNING FROM BACK TO SIDE WHILE IN FLAT BAD: TOTAL
MOVING FROM LYING ON BACK TO SITTING ON SIDE OF FLAT BED WITH BEDRAILS: TOTAL

## 2024-07-19 ASSESSMENT — PAIN - FUNCTIONAL ASSESSMENT: PAIN_FUNCTIONAL_ASSESSMENT: WONG-BAKER FACES

## 2024-07-19 ASSESSMENT — PAIN SCALES - WONG BAKER: WONGBAKER_NUMERICALRESPONSE: HURTS WHOLE LOT

## 2024-07-19 NOTE — PROGRESS NOTES
This note was created using voice recognition transcription software. Despite proofreading, unintentional typographical errors may be present. Please contact the GI office with any questions or concerns.       Subjective  No bleeding overnight.  1 brown BM yesterday.          Physical Exam:  General: Polite, calm, resting  Skin:  Warm and dry, no jaundice  HEENT: No scleral icterus, no conjunctival pallor, normocephalic, atraumatic, mucous membranes moist  Neck:  atraumatic, trachea midline, no JVD  Chest:  Clear to auscultation bilaterally. No wheezes, rales, or rhonchi  CV:  Regular rate and rhythm.  Positive S1/S2  Abdomen: no distension, +BS, soft, non-tender to palpation, no rebound tenderness, no guarding, no rigidity, no discernible ascites   Extremities: no lower extremity edema, chronic pigmentary changes, no cyanosis  Neurological:  A&Ox3  Psychiatric: cooperative     Investigations:  Labs, radiological imaging and cardiac work up were reviewed        Objective:         7/18/2024     7:53 AM 7/18/2024    11:36 AM 7/18/2024     4:45 PM 7/18/2024     7:45 PM 7/18/2024    11:36 PM 7/19/2024     4:08 AM 7/19/2024     7:54 AM   Vitals   Systolic 136 142 109 131 120 130 130   Diastolic 80 70 66 66 71 82 61   Heart Rate 90 89 76 78 76 91 80   Temp 36.7 °C (98.1 °F) 36.6 °C (97.9 °F) 36.3 °C (97.3 °F) 36.4 °C (97.5 °F) 36 °C (96.8 °F) 36.3 °C (97.3 °F) 36.6 °C (97.9 °F)          Medications:  acyclovir, 200 mg, oral, BID  amLODIPine, 10 mg, oral, Daily  [Held by provider] apixaban, 2.5 mg, oral, BID  atorvastatin, 40 mg, oral, Nightly  benzonatate, 100 mg, oral, TID  calcium carbonate, 2 tablet, oral, TID  carvedilol, 12.5 mg, oral, BID  cetirizine, 10 mg, oral, Daily  docusate sodium, 100 mg, oral, BID  [Held by provider] enzalutamide, 160 mg, oral, Daily  [START ON 7/21/2024] ergocalciferol, 1,250 mcg, oral, Every Sunday  fluticasone, 2 spray, Each Nostril, Daily  [Held by provider] furosemide, 40 mg, oral,  Daily  gabapentin, 300 mg, oral, BID  insulin lispro, 0-5 Units, subcutaneous, TID  [Held by provider] lenalidomide, 25 mg, oral, Daily  pantoprazole, 40 mg, intravenous, BID  potassium chloride, 20 mEq, intravenous, q2h  tamsulosin, 0.4 mg, oral, Daily  valproic acid, 750 mg, oral, BID         Recent Results (from the past 72 hour(s))   Electrocardiogram, 12-lead PRN ACS symptoms    Collection Time: 07/16/24  9:05 AM   Result Value Ref Range    Ventricular Rate 87 BPM    Atrial Rate 88 BPM    DC Interval 183 ms    QRS Duration 103 ms    QT Interval 342 ms    QTC Calculation(Bazett) 412 ms    P Axis 77 degrees    R Axis 70 degrees    T Axis -48 degrees    QRS Count 14 beats    Q Onset 252 ms    T Offset 423 ms    QTC Fredericia 387 ms   CBC and Auto Differential    Collection Time: 07/16/24  9:20 AM   Result Value Ref Range    WBC 2.5 (L) 4.4 - 11.3 x10*3/uL    nRBC 0.0 0.0 - 0.0 /100 WBCs    RBC 3.84 (L) 4.50 - 5.90 x10*6/uL    Hemoglobin 10.6 (L) 13.5 - 17.5 g/dL    Hematocrit 33.9 (L) 41.0 - 52.0 %    MCV 88 80 - 100 fL    MCH 27.6 26.0 - 34.0 pg    MCHC 31.3 (L) 32.0 - 36.0 g/dL    RDW 16.3 (H) 11.5 - 14.5 %    Platelets 93 (L) 150 - 450 x10*3/uL    Neutrophils % 50.5 40.0 - 80.0 %    Immature Granulocytes %, Automated 2.0 (H) 0.0 - 0.9 %    Lymphocytes % 9.3 13.0 - 44.0 %    Monocytes % 25.2 2.0 - 10.0 %    Eosinophils % 10.6 0.0 - 6.0 %    Basophils % 2.4 0.0 - 2.0 %    Neutrophils Absolute 1.24 1.20 - 7.70 x10*3/uL    Immature Granulocytes Absolute, Automated 0.05 0.00 - 0.70 x10*3/uL    Lymphocytes Absolute 0.23 (L) 1.20 - 4.80 x10*3/uL    Monocytes Absolute 0.62 0.10 - 1.00 x10*3/uL    Eosinophils Absolute 0.26 0.00 - 0.70 x10*3/uL    Basophils Absolute 0.06 0.00 - 0.10 x10*3/uL   Comprehensive metabolic panel    Collection Time: 07/16/24  9:20 AM   Result Value Ref Range    Glucose 104 (H) 74 - 99 mg/dL    Sodium 135 (L) 136 - 145 mmol/L    Potassium 4.0 3.5 - 5.3 mmol/L    Chloride 102 98 - 107 mmol/L     Bicarbonate 26 21 - 32 mmol/L    Anion Gap 11 10 - 20 mmol/L    Urea Nitrogen 11 6 - 23 mg/dL    Creatinine 1.12 0.50 - 1.30 mg/dL    eGFR 74 >60 mL/min/1.73m*2    Calcium 7.9 (L) 8.6 - 10.3 mg/dL    Albumin 3.4 3.4 - 5.0 g/dL    Alkaline Phosphatase 83 33 - 136 U/L    Total Protein 5.9 (L) 6.4 - 8.2 g/dL    AST 10 9 - 39 U/L    Bilirubin, Total 0.4 0.0 - 1.2 mg/dL    ALT 5 (L) 10 - 52 U/L   Lactate    Collection Time: 07/16/24  9:20 AM   Result Value Ref Range    Lactate 1.1 0.4 - 2.0 mmol/L   Type and Screen    Collection Time: 07/16/24  9:20 AM   Result Value Ref Range    ABO TYPE O     Rh TYPE POS     ANTIBODY SCREEN POS    Coagulation Screen    Collection Time: 07/16/24  9:20 AM   Result Value Ref Range    Protime 14.1 (H) 9.8 - 12.8 seconds    INR 1.3 (H) 0.9 - 1.1    aPTT 37 27 - 38 seconds   Type and Screen    Collection Time: 07/16/24 11:16 AM   Result Value Ref Range    ABO TYPE O     Rh TYPE POS     ANTIBODY SCREEN POS    BB ORDER ONLY - Antibody Identification    Collection Time: 07/16/24 11:16 AM   Result Value Ref Range    Antibody ID [id number]     CASE #     POCT GLUCOSE    Collection Time: 07/16/24  4:49 PM   Result Value Ref Range    POCT Glucose 103 (H) 74 - 99 mg/dL   POCT GLUCOSE    Collection Time: 07/16/24  7:48 PM   Result Value Ref Range    POCT Glucose 99 74 - 99 mg/dL   Hemoglobin and hematocrit, blood    Collection Time: 07/16/24  8:28 PM   Result Value Ref Range    Hemoglobin 11.1 (L) 13.5 - 17.5 g/dL    Hematocrit 34.1 (L) 41.0 - 52.0 %   Urinalysis with Reflex Microscopic    Collection Time: 07/17/24  3:21 AM   Result Value Ref Range    Color, Urine Yellow Light-Yellow, Yellow, Dark-Yellow    Appearance, Urine Clear Clear    Specific Gravity, Urine 1.020 1.005 - 1.035    pH, Urine 7.0 5.0, 5.5, 6.0, 6.5, 7.0, 7.5, 8.0    Protein, Urine NEGATIVE NEGATIVE, 10 (TRACE), 20 (TRACE) mg/dL    Glucose, Urine Normal Normal mg/dL    Blood, Urine 0.03 (TRACE) (A) NEGATIVE    Ketones, Urine TRACE  (A) NEGATIVE mg/dL    Bilirubin, Urine NEGATIVE NEGATIVE    Urobilinogen, Urine 6 (2+) (A) Normal mg/dL    Nitrite, Urine NEGATIVE NEGATIVE    Leukocyte Esterase, Urine NEGATIVE NEGATIVE   Microscopic Only, Urine    Collection Time: 07/17/24  3:21 AM   Result Value Ref Range    WBC, Urine NONE 1-5, NONE /HPF    RBC, Urine NONE NONE, 1-2, 3-5 /HPF   CBC    Collection Time: 07/17/24  6:13 AM   Result Value Ref Range    WBC 2.9 (L) 4.4 - 11.3 x10*3/uL    nRBC 0.0 0.0 - 0.0 /100 WBCs    RBC 3.90 (L) 4.50 - 5.90 x10*6/uL    Hemoglobin 10.8 (L) 13.5 - 17.5 g/dL    Hematocrit 33.8 (L) 41.0 - 52.0 %    MCV 87 80 - 100 fL    MCH 27.7 26.0 - 34.0 pg    MCHC 32.0 32.0 - 36.0 g/dL    RDW 16.6 (H) 11.5 - 14.5 %    Platelets 73 (L) 150 - 450 x10*3/uL   Basic metabolic panel    Collection Time: 07/17/24  6:13 AM   Result Value Ref Range    Glucose 114 (H) 74 - 99 mg/dL    Sodium 137 136 - 145 mmol/L    Potassium 3.9 3.5 - 5.3 mmol/L    Chloride 103 98 - 107 mmol/L    Bicarbonate 27 21 - 32 mmol/L    Anion Gap 11 10 - 20 mmol/L    Urea Nitrogen 12 6 - 23 mg/dL    Creatinine 1.24 0.50 - 1.30 mg/dL    eGFR 65 >60 mL/min/1.73m*2    Calcium 7.3 (L) 8.6 - 10.3 mg/dL   SST TOP    Collection Time: 07/17/24  6:13 AM   Result Value Ref Range    Extra Tube Hold for add-ons.    SST TOP    Collection Time: 07/17/24  6:13 AM   Result Value Ref Range    Extra Tube Hold for add-ons.    POCT GLUCOSE    Collection Time: 07/17/24  7:32 AM   Result Value Ref Range    POCT Glucose 103 (H) 74 - 99 mg/dL   POCT GLUCOSE    Collection Time: 07/17/24 10:46 AM   Result Value Ref Range    POCT Glucose 101 (H) 74 - 99 mg/dL   POCT GLUCOSE    Collection Time: 07/17/24 12:28 PM   Result Value Ref Range    POCT Glucose 121 (H) 74 - 99 mg/dL   POCT GLUCOSE    Collection Time: 07/17/24  4:31 PM   Result Value Ref Range    POCT Glucose 117 (H) 74 - 99 mg/dL   Urinalysis with Reflex Culture and Microscopic    Collection Time: 07/17/24  5:41 PM   Result Value Ref  Range    Color, Urine Light-Yellow Light-Yellow, Yellow, Dark-Yellow    Appearance, Urine Clear Clear    Specific Gravity, Urine 1.013 1.005 - 1.035    pH, Urine 7.0 5.0, 5.5, 6.0, 6.5, 7.0, 7.5, 8.0    Protein, Urine NEGATIVE NEGATIVE, 10 (TRACE), 20 (TRACE) mg/dL    Glucose, Urine Normal Normal mg/dL    Blood, Urine 0.06 (1+) (A) NEGATIVE    Ketones, Urine 10 (1+) (A) NEGATIVE mg/dL    Bilirubin, Urine NEGATIVE NEGATIVE    Urobilinogen, Urine Normal Normal mg/dL    Nitrite, Urine NEGATIVE NEGATIVE    Leukocyte Esterase, Urine NEGATIVE NEGATIVE   Urinalysis Microscopic    Collection Time: 07/17/24  5:41 PM   Result Value Ref Range    WBC, Urine 1-5 1-5, NONE /HPF    RBC, Urine 1-2 NONE, 1-2, 3-5 /HPF    Bacteria, Urine 1+ (A) NONE SEEN /HPF    Hyaline Casts, Urine OCCASIONAL (A) NONE /LPF   Blood Culture    Collection Time: 07/17/24  7:14 PM    Specimen: Peripheral Venipuncture; Blood culture   Result Value Ref Range    Blood Culture No growth at 1 day    Blood Culture    Collection Time: 07/17/24  7:14 PM    Specimen: Peripheral Venipuncture; Blood culture   Result Value Ref Range    Blood Culture No growth at 1 day    POCT GLUCOSE    Collection Time: 07/17/24  7:46 PM   Result Value Ref Range    POCT Glucose 126 (H) 74 - 99 mg/dL   SST TOP    Collection Time: 07/18/24  7:24 AM   Result Value Ref Range    Extra Tube Hold for add-ons.    CBC    Collection Time: 07/18/24  7:26 AM   Result Value Ref Range    WBC 3.4 (L) 4.4 - 11.3 x10*3/uL    nRBC 0.0 0.0 - 0.0 /100 WBCs    RBC 3.87 (L) 4.50 - 5.90 x10*6/uL    Hemoglobin 10.5 (L) 13.5 - 17.5 g/dL    Hematocrit 32.7 (L) 41.0 - 52.0 %    MCV 85 80 - 100 fL    MCH 27.1 26.0 - 34.0 pg    MCHC 32.1 32.0 - 36.0 g/dL    RDW 16.5 (H) 11.5 - 14.5 %    Platelets 72 (L) 150 - 450 x10*3/uL   Basic Metabolic Panel    Collection Time: 07/18/24  7:26 AM   Result Value Ref Range    Glucose 116 (H) 74 - 99 mg/dL    Sodium 134 (L) 136 - 145 mmol/L    Potassium 3.3 (L) 3.5 - 5.3 mmol/L     Chloride 102 98 - 107 mmol/L    Bicarbonate 22 21 - 32 mmol/L    Anion Gap 13 10 - 20 mmol/L    Urea Nitrogen 11 6 - 23 mg/dL    Creatinine 1.26 0.50 - 1.30 mg/dL    eGFR 64 >60 mL/min/1.73m*2    Calcium 6.9 (L) 8.6 - 10.3 mg/dL   Magnesium    Collection Time: 07/18/24  7:26 AM   Result Value Ref Range    Magnesium 1.54 (L) 1.60 - 2.40 mg/dL   POCT GLUCOSE    Collection Time: 07/18/24  7:47 AM   Result Value Ref Range    POCT Glucose 112 (H) 74 - 99 mg/dL   POCT GLUCOSE    Collection Time: 07/18/24 12:04 PM   Result Value Ref Range    POCT Glucose 151 (H) 74 - 99 mg/dL   POCT GLUCOSE    Collection Time: 07/18/24  4:48 PM   Result Value Ref Range    POCT Glucose 155 (H) 74 - 99 mg/dL   POCT GLUCOSE    Collection Time: 07/18/24  7:41 PM   Result Value Ref Range    POCT Glucose 95 74 - 99 mg/dL   Basic Metabolic Panel    Collection Time: 07/19/24  6:33 AM   Result Value Ref Range    Glucose 112 (H) 74 - 99 mg/dL    Sodium 132 (L) 136 - 145 mmol/L    Potassium 3.3 (L) 3.5 - 5.3 mmol/L    Chloride 99 98 - 107 mmol/L    Bicarbonate 23 21 - 32 mmol/L    Anion Gap 13 10 - 20 mmol/L    Urea Nitrogen 9 6 - 23 mg/dL    Creatinine 1.22 0.50 - 1.30 mg/dL    eGFR 67 >60 mL/min/1.73m*2    Calcium 6.7 (L) 8.6 - 10.3 mg/dL   Magnesium    Collection Time: 07/19/24  6:33 AM   Result Value Ref Range    Magnesium 1.60 1.60 - 2.40 mg/dL   CBC    Collection Time: 07/19/24  6:33 AM   Result Value Ref Range    WBC 3.2 (L) 4.4 - 11.3 x10*3/uL    nRBC 0.0 0.0 - 0.0 /100 WBCs    RBC 3.54 (L) 4.50 - 5.90 x10*6/uL    Hemoglobin 9.7 (L) 13.5 - 17.5 g/dL    Hematocrit 30.1 (L) 41.0 - 52.0 %    MCV 85 80 - 100 fL    MCH 27.4 26.0 - 34.0 pg    MCHC 32.2 32.0 - 36.0 g/dL    RDW 15.9 (H) 11.5 - 14.5 %    Platelets 75 (L) 150 - 450 x10*3/uL   POCT GLUCOSE    Collection Time: 07/19/24  7:51 AM   Result Value Ref Range    POCT Glucose 98 74 - 99 mg/dL          Assessment:  This is a 62 yo Male with a PMH of DM, HTN, HLD, prostate CA s/p radiation,  multiple myeloma, CKD, seizures, and meningioma who presented to [facility] on 7/16/24 with reports of a GIB.  GI was consulted for GIB.  Colonoscopy was performed on 7/17/24 showing radiation proctitis with a large rectal ulcer with small overlying clot and few suspicious vessels s/p gold probe coagulation and 2 unsuccessful clips.  Area was oozing and repeat coagulation and hemospray.  Erythema and edema in the distal descending/sigmoid colon, lipoma in the ascending colon (fair prep).  Hgb 9.7.        Plan  1.)  GIB-Trend Hgb, monitor for overt bleeding, transfuse as necessary, consider indefinitely holding AC if possible.    Will sign off.  Discussed patient with Dr. Taylor.    I spent 30 minutes in the professional and overall care of this patient.      07/19/24 at 9:00 AM - MANUEL Junior-CNP

## 2024-07-19 NOTE — CONSULTS
"Nutrition Initial Assessment:   Nutrition Assessment    Reason for Assessment: NPO/clear liquids >5 days    Patient is a 63 y.o. male presenting with gastrointestinal hemorrhage. In COVID precautions, attempted to call phone in pt's room but pt did not answer. All hx obtained via chart review.       Nutrition History:  Energy Intake: Poor < 50 %  Food and Nutrient History: On CL diet this admission. Diet hx from prior to this admission is unknown. Currently unable to meet full nutrition needs while on CL diet.  Vitamin/Herbal Supplement Use: calcium + D3, vitamin D2, per home med list  Food Allergies/Intolerances:  None  GI Symptoms:  GEOVANNY  Oral Problems:  GEOVANNY       Anthropometrics:  Height: 200.7 cm (6' 7\")   Weight: 150 kg (330 lb)   BMI (Calculated): 37.16  IBW/kg (Dietitian Calculated): 100 kg          Weight History:   Wt Readings from Last 15 Encounters:   07/16/24 150 kg (330 lb)   3/20/24 164.2 kg   2/5/24 163.3 kg      Weight Change %:  Weight History / % Weight Change: Based on available wt records, pt with 14 kg (8.5%) wt loss x 4 months - not significant at this time.  Significant Weight Loss: No    Nutrition Focused Physical Exam Findings:  defer: COVID precautions  Subcutaneous Fat Loss:   Orbital Fat Pads: Defer  Muscle Wasting:  Temporalis: Defer  Edema:  Edema Location: non-pitting BLE edema per nursing assessment  Physical Findings:  Skin: Positive (buttocks skin tear per nursing assessment)    Nutrition Significant Labs:    Reviewed   Nutrition Specific Medications:  Reviewed     I/O:   Last BM Date: 07/18/24; Stool Appearance: Watery, Loose (07/18/24 1027)    Dietary Orders (From admission, onward)       Start     Ordered    07/19/24 1502  Oral nutritional supplements  Until discontinued        Comments: Apple only   Question Answer Comment   Deliver with All meals    Select supplement: Ensure Clear        07/19/24 1501    07/17/24 1601  Adult diet Clear Liquid  Diet effective now        Question: "  Diet type  Answer:  Clear Liquid    07/17/24 1600    07/16/24 1626  May Participate in Room Service  Once        Question:  .  Answer:  Yes    07/16/24 1626                     Estimated Needs:   Total Energy Estimated Needs (kCal): 3000 kCal  Method for Estimating Needs: 30 kcal/kg IBW  Total Protein Estimated Needs (g): 120 g  Method for Estimating Needs: 1.2 g/kg IBW or as renal function permits  Total Fluid Estimated Needs (mL): 3000 mL  Method for Estimating Needs: 1 ml/kcal or per MD        Nutrition Diagnosis   Malnutrition Diagnosis  Patient has Malnutrition Diagnosis:  (unable to determine at this time)    Nutrition Diagnosis  Patient has Nutrition Diagnosis: Yes  Diagnosis Status (1): New  Nutrition Diagnosis 1: Inadequate oral intake  Related to (1): GI bleed, increased nutrient needs  As Evidenced by (1): catabolic disease, NPO status       Nutrition Interventions/Recommendations         Nutrition Prescription:  Individualized Nutrition Prescription Provided for : Diet advancement as medically appropriate; ONS while on CL diet        Nutrition Interventions:   Interventions: Meals and snacks, Medical food supplement  Meals and Snacks: General healthful diet  Goal: Consumes 3 meals per day  Medical Food Supplement: Commercial beverage  Goal: Ensure Clear TID (provides 240 kcal, 8 g protein per serving).         Nutrition Education:   N/A - pt unavailable       Nutrition Monitoring and Evaluation   Food/Nutrient Related History Monitoring  Monitoring and Evaluation Plan: Energy intake, Amount of food, Fluid intake  Energy Intake: Estimated energy intake  Criteria: Pt meets >75% of estimated energy needs  Fluid Intake: Estimated fluid intake  Criteria: Meets >75% of estimated fluid needs  Amount of Food: Estimated amout of food, Medical food intake  Criteria: Pt consumes >75% of meals and supplements  Additional Plans: Diet advancement    Body Composition/Growth/Weight History  Monitoring and Evaluation  Plan: Weight  Weight: Measured weight  Criteria: Maintains stable weight    Biochemical Data, Medical Tests and Procedures  Monitoring and Evaluation Plan: Glucose/endocrine profile, Electrolyte/renal panel  Electrolyte and Renal Panel: Sodium, Potassium, Phosphorus  Criteria: Electrolytes WNL  Glucose/Endocrine Profile: Glucose, casual  Criteria: BG within desirable range    Nutrition Focused Physical Findings  Monitoring and Evaluation Plan: Digestive System  Criteria: Improvement in GI symptoms         Time Spent (min): 30 minutes

## 2024-07-19 NOTE — PROGRESS NOTES
Ash Rodrigues is a 63 y.o. male on day 0 of admission presenting with Gastrointestinal hemorrhage, unspecified gastrointestinal hemorrhage type.      Subjective   63-year-old male with past medical history of diabetes, hypertension, hyperlipidemia, prostate cancer status post radiation, CKD, meningioma, presenting the emergency department concern for GI bleed.     He had a he had a cautery Rectal ulceration with bleeding better so far has been stable hemoglobin is stable he is afebrile he did he is positive for coated and so far very lethargic cognitively impaired I do not know what his baseline is however he is very lethargic       Objective     Last Recorded Vitals  /66   Pulse 78   Temp 36.4 °C (97.5 °F)   Resp 18   Wt 150 kg (330 lb)   SpO2 100%   Intake/Output last 3 Shifts:    Intake/Output Summary (Last 24 hours) at 7/18/2024 2001  Last data filed at 7/18/2024 1900  Gross per 24 hour   Intake 1250 ml   Output 136 ml   Net 1114 ml       Admission Weight  Weight: 150 kg (330 lb) (07/16/24 0905)    Daily Weight  07/16/24 : 150 kg (330 lb)    Image Results  Electrocardiogram, 12-lead PRN ACS symptoms  Sinus rhythm  Abnormal R-wave progression, early transition    See ED provider note for full interpretation and clinical correlation  Confirmed by Zena Galeano ([id number]) on 7/18/2024 10:11:20 AM      Physical Exam  His lungs are very diminished his heart has regular rate and rhythm abdomen soft is not distended or firm  Relevant Results               Assessment/Plan                  Principal Problem:    Gastrointestinal hemorrhage, unspecified gastrointestinal hemorrhage type  Active Problems:    Seizures (Multi)    HTN (hypertension)    Hyperlipidemia    Diabetes mellitus, type 2 (Multi)    Pneumonia    History of home oxygen therapy    Obesity    Code 19 saturations are good   He is only congested otherwise saturations are satisfactory he has had a colonoscopy he had cautery and H&H is stable at some  slight oozing but otherwise doing well from that standpoint continue and monitor his mentation repeat a CBC as needed continue all the present care and therapy thank you              Mendel Regan, DO

## 2024-07-19 NOTE — PROGRESS NOTES
Ash Rodrigues is a 63 y.o. male on day 0 of admission presenting with Gastrointestinal hemorrhage, unspecified gastrointestinal hemorrhage type.      Subjective   Seen today he is more awake but still very lethargic he has encephalopathy could be from heavy alcohol use he is COVID-19 positive this morning so far appears to be stable       Objective     Last Recorded Vitals  /89 (BP Location: Left arm, Patient Position: Lying)   Pulse 83   Temp 36.5 °C (97.7 °F) (Temporal)   Resp 18   Wt 150 kg (330 lb)   SpO2 95%   Intake/Output last 3 Shifts:    Intake/Output Summary (Last 24 hours) at 7/19/2024 1524  Last data filed at 7/19/2024 1345  Gross per 24 hour   Intake 1490 ml   Output 1400 ml   Net 90 ml       Admission Weight  Weight: 150 kg (330 lb) (07/16/24 0905)    Daily Weight  07/16/24 : 150 kg (330 lb)    Image Results  Electrocardiogram, 12-lead PRN ACS symptoms  Sinus rhythm  Abnormal R-wave progression, early transition    See ED provider note for full interpretation and clinical correlation  Confirmed by Zena Galeano ([id number]) on 7/18/2024 10:11:20 AM    Results from last 7 days   Lab Units 07/19/24  0633   WBC AUTO x10*3/uL 3.2*   HEMOGLOBIN g/dL 9.7*   HEMATOCRIT % 30.1*   PLATELETS AUTO x10*3/uL 75*      Results from last 7 days   Lab Units 07/19/24  0633   SODIUM mmol/L 132*   POTASSIUM mmol/L 3.3*   CHLORIDE mmol/L 99   CO2 mmol/L 23   BUN mg/dL 9   CREATININE mg/dL 1.22   GLUCOSE mg/dL 112*   CALCIUM mg/dL 6.7*      In the review of systems weakness lethargy no vomiting or diarrhea    Physical Exam  Lungs are clear anteriorly heart has regular rate and rhythm abdomen is soft is not distended  Relevant Results               Assessment/Plan                  Principal Problem:    Gastrointestinal hemorrhage, unspecified gastrointestinal hemorrhage type  Active Problems:    Seizures (Multi)    HTN (hypertension)    Hyperlipidemia    Diabetes mellitus, type 2 (Multi)    Pneumonia    History of  home oxygen therapy    Obesity    So far COVID-19 also had this GI bleeding from his rectal ulcer his H&H is stable if not moving anywhere I am just going to keep him through the weekend because of more so of the COVID-19 pandemic check a pulse ox on him he complains of some shortness of breath liable to check pulse ox to see whether there is and then we will go from there    I am thinking of discharge Monday morning continue the same present care and therapy management thank you              Mendel Regan, DO

## 2024-07-20 LAB
ANION GAP SERPL CALC-SCNC: 14 MMOL/L (ref 10–20)
BUN SERPL-MCNC: 8 MG/DL (ref 6–23)
CALCIUM SERPL-MCNC: 6.7 MG/DL (ref 8.6–10.3)
CHLORIDE SERPL-SCNC: 100 MMOL/L (ref 98–107)
CO2 SERPL-SCNC: 22 MMOL/L (ref 21–32)
CREAT SERPL-MCNC: 1.02 MG/DL (ref 0.5–1.3)
EGFRCR SERPLBLD CKD-EPI 2021: 83 ML/MIN/1.73M*2
ERYTHROCYTE [DISTWIDTH] IN BLOOD BY AUTOMATED COUNT: 15.7 % (ref 11.5–14.5)
GLUCOSE BLD MANUAL STRIP-MCNC: 113 MG/DL (ref 74–99)
GLUCOSE BLD MANUAL STRIP-MCNC: 116 MG/DL (ref 74–99)
GLUCOSE BLD MANUAL STRIP-MCNC: 123 MG/DL (ref 74–99)
GLUCOSE BLD MANUAL STRIP-MCNC: 172 MG/DL (ref 74–99)
GLUCOSE SERPL-MCNC: 113 MG/DL (ref 74–99)
HCT VFR BLD AUTO: 30.6 % (ref 41–52)
HGB BLD-MCNC: 9.8 G/DL (ref 13.5–17.5)
MAGNESIUM SERPL-MCNC: 1.68 MG/DL (ref 1.6–2.4)
MCH RBC QN AUTO: 27.3 PG (ref 26–34)
MCHC RBC AUTO-ENTMCNC: 32 G/DL (ref 32–36)
MCV RBC AUTO: 85 FL (ref 80–100)
NRBC BLD-RTO: 0 /100 WBCS (ref 0–0)
PLATELET # BLD AUTO: 85 X10*3/UL (ref 150–450)
POTASSIUM SERPL-SCNC: 3.3 MMOL/L (ref 3.5–5.3)
RBC # BLD AUTO: 3.59 X10*6/UL (ref 4.5–5.9)
SODIUM SERPL-SCNC: 133 MMOL/L (ref 136–145)
WBC # BLD AUTO: 2.8 X10*3/UL (ref 4.4–11.3)

## 2024-07-20 PROCEDURE — 2500000002 HC RX 250 W HCPCS SELF ADMINISTERED DRUGS (ALT 637 FOR MEDICARE OP, ALT 636 FOR OP/ED): Performed by: PHYSICIAN ASSISTANT

## 2024-07-20 PROCEDURE — 80048 BASIC METABOLIC PNL TOTAL CA: CPT | Performed by: PHYSICIAN ASSISTANT

## 2024-07-20 PROCEDURE — 2500000001 HC RX 250 WO HCPCS SELF ADMINISTERED DRUGS (ALT 637 FOR MEDICARE OP): Performed by: INTERNAL MEDICINE

## 2024-07-20 PROCEDURE — 2500000001 HC RX 250 WO HCPCS SELF ADMINISTERED DRUGS (ALT 637 FOR MEDICARE OP): Performed by: PHYSICIAN ASSISTANT

## 2024-07-20 PROCEDURE — 85027 COMPLETE CBC AUTOMATED: CPT | Performed by: PHYSICIAN ASSISTANT

## 2024-07-20 PROCEDURE — C9113 INJ PANTOPRAZOLE SODIUM, VIA: HCPCS | Performed by: PHYSICIAN ASSISTANT

## 2024-07-20 PROCEDURE — 36415 COLL VENOUS BLD VENIPUNCTURE: CPT | Performed by: PHYSICIAN ASSISTANT

## 2024-07-20 PROCEDURE — 83735 ASSAY OF MAGNESIUM: CPT | Performed by: PHYSICIAN ASSISTANT

## 2024-07-20 PROCEDURE — 82947 ASSAY GLUCOSE BLOOD QUANT: CPT

## 2024-07-20 PROCEDURE — 2500000004 HC RX 250 GENERAL PHARMACY W/ HCPCS (ALT 636 FOR OP/ED): Performed by: PHYSICIAN ASSISTANT

## 2024-07-20 PROCEDURE — 1200000002 HC GENERAL ROOM WITH TELEMETRY DAILY

## 2024-07-20 RX ORDER — POTASSIUM CHLORIDE 20 MEQ/1
40 TABLET, EXTENDED RELEASE ORAL ONCE
Status: COMPLETED | OUTPATIENT
Start: 2024-07-20 | End: 2024-07-20

## 2024-07-20 ASSESSMENT — PAIN SCALES - GENERAL
PAINLEVEL_OUTOF10: 6
PAINLEVEL_OUTOF10: 0 - NO PAIN
PAINLEVEL_OUTOF10: 0 - NO PAIN
PAINLEVEL_OUTOF10: 10 - WORST POSSIBLE PAIN

## 2024-07-20 ASSESSMENT — COGNITIVE AND FUNCTIONAL STATUS - GENERAL
EATING MEALS: A LOT
WALKING IN HOSPITAL ROOM: TOTAL
PERSONAL GROOMING: TOTAL
DAILY ACTIVITIY SCORE: 7
MOVING TO AND FROM BED TO CHAIR: TOTAL
TURNING FROM BACK TO SIDE WHILE IN FLAT BAD: TOTAL
DAILY ACTIVITIY SCORE: 7
MOVING TO AND FROM BED TO CHAIR: TOTAL
DRESSING REGULAR UPPER BODY CLOTHING: TOTAL
MOBILITY SCORE: 6
CLIMB 3 TO 5 STEPS WITH RAILING: TOTAL
DRESSING REGULAR LOWER BODY CLOTHING: TOTAL
PERSONAL GROOMING: TOTAL
TOILETING: TOTAL
MOVING FROM LYING ON BACK TO SITTING ON SIDE OF FLAT BED WITH BEDRAILS: TOTAL
WALKING IN HOSPITAL ROOM: TOTAL
TURNING FROM BACK TO SIDE WHILE IN FLAT BAD: TOTAL
STANDING UP FROM CHAIR USING ARMS: TOTAL
EATING MEALS: A LOT
DRESSING REGULAR UPPER BODY CLOTHING: TOTAL
HELP NEEDED FOR BATHING: TOTAL
STANDING UP FROM CHAIR USING ARMS: TOTAL
MOBILITY SCORE: 6
DRESSING REGULAR LOWER BODY CLOTHING: TOTAL
TOILETING: TOTAL
MOVING FROM LYING ON BACK TO SITTING ON SIDE OF FLAT BED WITH BEDRAILS: TOTAL
HELP NEEDED FOR BATHING: TOTAL
CLIMB 3 TO 5 STEPS WITH RAILING: TOTAL

## 2024-07-20 ASSESSMENT — PAIN DESCRIPTION - LOCATION
LOCATION: GENERALIZED
LOCATION: GENERALIZED

## 2024-07-20 NOTE — PROGRESS NOTES
Ash Rodrigues is a 63 y.o. male on day 0 of admission presenting with Gastrointestinal hemorrhage, unspecified gastrointestinal hemorrhage type.      Subjective   Seen today as mentation is about the same stable he has COVID-19 discharge planning will now be in order       Objective     Last Recorded Vitals  /56   Pulse 87   Temp 36.3 °C (97.3 °F)   Resp 20   Wt 150 kg (330 lb)   SpO2 92%   Intake/Output last 3 Shifts:    Intake/Output Summary (Last 24 hours) at 7/20/2024 1607  Last data filed at 7/20/2024 1500  Gross per 24 hour   Intake 480 ml   Output 1900 ml   Net -1420 ml       Admission Weight  Weight: 150 kg (330 lb) (07/16/24 0905)    Daily Weight  07/16/24 : 150 kg (330 lb)    Image Results  Electrocardiogram, 12-lead PRN ACS symptoms  Sinus rhythm  Abnormal R-wave progression, early transition    See ED provider note for full interpretation and clinical correlation  Confirmed by Zena Galeano ([id number]) on 7/18/2024 10:11:20 AM      Physical Exam  Is lethargic but he looks stable his skin is warm and dry no further bleeding has been reported  Relevant Results               Assessment/Plan                  Principal Problem:    Gastrointestinal hemorrhage, unspecified gastrointestinal hemorrhage type  Active Problems:    Seizures (Multi)    HTN (hypertension)    Hyperlipidemia    Diabetes mellitus, type 2 (Multi)    Pneumonia    History of home oxygen therapy    Obesity    For now continuing monitor his H&H keep his blood sugars under control COVID-19 precautions isolation and he will need to be in isolation for about 6 more days because of COVID              Mendel Regan DO

## 2024-07-21 VITALS
WEIGHT: 315 LBS | TEMPERATURE: 97 F | DIASTOLIC BLOOD PRESSURE: 78 MMHG | SYSTOLIC BLOOD PRESSURE: 130 MMHG | OXYGEN SATURATION: 97 % | HEIGHT: 78 IN | BODY MASS INDEX: 36.45 KG/M2 | HEART RATE: 91 BPM | RESPIRATION RATE: 18 BRPM

## 2024-07-21 LAB
ANION GAP SERPL CALC-SCNC: 15 MMOL/L (ref 10–20)
BACTERIA BLD CULT: NORMAL
BACTERIA BLD CULT: NORMAL
BUN SERPL-MCNC: 7 MG/DL (ref 6–23)
CALCIUM SERPL-MCNC: 6.5 MG/DL (ref 8.6–10.3)
CHLORIDE SERPL-SCNC: 103 MMOL/L (ref 98–107)
CO2 SERPL-SCNC: 21 MMOL/L (ref 21–32)
CREAT SERPL-MCNC: 0.9 MG/DL (ref 0.5–1.3)
EGFRCR SERPLBLD CKD-EPI 2021: >90 ML/MIN/1.73M*2
ERYTHROCYTE [DISTWIDTH] IN BLOOD BY AUTOMATED COUNT: 15.7 % (ref 11.5–14.5)
GLUCOSE BLD MANUAL STRIP-MCNC: 111 MG/DL (ref 74–99)
GLUCOSE BLD MANUAL STRIP-MCNC: 128 MG/DL (ref 74–99)
GLUCOSE BLD MANUAL STRIP-MCNC: 133 MG/DL (ref 74–99)
GLUCOSE BLD MANUAL STRIP-MCNC: 141 MG/DL (ref 74–99)
GLUCOSE SERPL-MCNC: 120 MG/DL (ref 74–99)
HCT VFR BLD AUTO: 30.6 % (ref 41–52)
HGB BLD-MCNC: 9.6 G/DL (ref 13.5–17.5)
MCH RBC QN AUTO: 27.4 PG (ref 26–34)
MCHC RBC AUTO-ENTMCNC: 31.4 G/DL (ref 32–36)
MCV RBC AUTO: 87 FL (ref 80–100)
NRBC BLD-RTO: 0 /100 WBCS (ref 0–0)
PLATELET # BLD AUTO: 78 X10*3/UL (ref 150–450)
POTASSIUM SERPL-SCNC: 3.6 MMOL/L (ref 3.5–5.3)
RBC # BLD AUTO: 3.51 X10*6/UL (ref 4.5–5.9)
SODIUM SERPL-SCNC: 135 MMOL/L (ref 136–145)
WBC # BLD AUTO: 2.7 X10*3/UL (ref 4.4–11.3)

## 2024-07-21 PROCEDURE — 85027 COMPLETE CBC AUTOMATED: CPT | Performed by: PHYSICIAN ASSISTANT

## 2024-07-21 PROCEDURE — 2500000001 HC RX 250 WO HCPCS SELF ADMINISTERED DRUGS (ALT 637 FOR MEDICARE OP): Performed by: PHYSICIAN ASSISTANT

## 2024-07-21 PROCEDURE — 80048 BASIC METABOLIC PNL TOTAL CA: CPT | Performed by: PHYSICIAN ASSISTANT

## 2024-07-21 PROCEDURE — 2500000001 HC RX 250 WO HCPCS SELF ADMINISTERED DRUGS (ALT 637 FOR MEDICARE OP): Performed by: INTERNAL MEDICINE

## 2024-07-21 PROCEDURE — 2500000004 HC RX 250 GENERAL PHARMACY W/ HCPCS (ALT 636 FOR OP/ED): Performed by: PHYSICIAN ASSISTANT

## 2024-07-21 PROCEDURE — 82947 ASSAY GLUCOSE BLOOD QUANT: CPT

## 2024-07-21 PROCEDURE — 2500000002 HC RX 250 W HCPCS SELF ADMINISTERED DRUGS (ALT 637 FOR MEDICARE OP, ALT 636 FOR OP/ED): Performed by: PHYSICIAN ASSISTANT

## 2024-07-21 PROCEDURE — 36415 COLL VENOUS BLD VENIPUNCTURE: CPT | Performed by: PHYSICIAN ASSISTANT

## 2024-07-21 PROCEDURE — C9113 INJ PANTOPRAZOLE SODIUM, VIA: HCPCS | Performed by: PHYSICIAN ASSISTANT

## 2024-07-21 PROCEDURE — 1200000002 HC GENERAL ROOM WITH TELEMETRY DAILY

## 2024-07-21 RX ORDER — TAMSULOSIN HYDROCHLORIDE 0.4 MG/1
0.4 CAPSULE ORAL DAILY
Start: 2024-07-22

## 2024-07-21 ASSESSMENT — COGNITIVE AND FUNCTIONAL STATUS - GENERAL
EATING MEALS: A LOT
MOVING FROM LYING ON BACK TO SITTING ON SIDE OF FLAT BED WITH BEDRAILS: TOTAL
HELP NEEDED FOR BATHING: TOTAL
PERSONAL GROOMING: TOTAL
DRESSING REGULAR LOWER BODY CLOTHING: TOTAL
DAILY ACTIVITIY SCORE: 7
DRESSING REGULAR UPPER BODY CLOTHING: TOTAL
MOBILITY SCORE: 6
MOVING TO AND FROM BED TO CHAIR: TOTAL
CLIMB 3 TO 5 STEPS WITH RAILING: TOTAL
WALKING IN HOSPITAL ROOM: TOTAL
STANDING UP FROM CHAIR USING ARMS: TOTAL
TOILETING: TOTAL
TURNING FROM BACK TO SIDE WHILE IN FLAT BAD: TOTAL

## 2024-07-21 ASSESSMENT — PAIN SCALES - GENERAL
PAINLEVEL_OUTOF10: 0 - NO PAIN
PAINLEVEL_OUTOF10: 10 - WORST POSSIBLE PAIN

## 2024-07-21 ASSESSMENT — PAIN DESCRIPTION - LOCATION: LOCATION: GENERALIZED

## 2024-07-21 NOTE — PROGRESS NOTES
Ash Rodrigues is a 63 y.o. male on day 1 of admission presenting with Gastrointestinal hemorrhage, unspecified gastrointestinal hemorrhage type.      Subjective   Seen today overall condition is the same no changes at this time hemodynamically stable saturations have been good is COVID-19 positive he will need to remain in isolation for about 4 more days       Objective     Last Recorded Vitals  /73   Pulse 86   Temp 35.6 °C (96.1 °F)   Resp 15   Wt 150 kg (330 lb)   SpO2 96%   Intake/Output last 3 Shifts:    Intake/Output Summary (Last 24 hours) at 7/21/2024 1623  Last data filed at 7/21/2024 1446  Gross per 24 hour   Intake 663 ml   Output 1300 ml   Net -637 ml       Admission Weight  Weight: 150 kg (330 lb) (07/16/24 0905)    Daily Weight  07/16/24 : 150 kg (330 lb)    Image Results  Electrocardiogram, 12-lead PRN ACS symptoms  Sinus rhythm  Abnormal R-wave progression, early transition    See ED provider note for full interpretation and clinical correlation  Confirmed by Zena Galeano ([id number]) on 7/18/2024 10:11:20 AM    Results from last 7 days   Lab Units 07/21/24  0531   WBC AUTO x10*3/uL 2.7*   HEMOGLOBIN g/dL 9.6*   HEMATOCRIT % 30.6*   PLATELETS AUTO x10*3/uL 78*      Results from last 7 days   Lab Units 07/21/24  0531   SODIUM mmol/L 135*   POTASSIUM mmol/L 3.6   CHLORIDE mmol/L 103   CO2 mmol/L 21   BUN mg/dL 7   CREATININE mg/dL 0.90   GLUCOSE mg/dL 120*   CALCIUM mg/dL 6.5*      In the review of systems is weakness lethargy no fever hemodynamically stable no further bleeding    Physical Exam  Lungs are diminished but clear heart has a regular rate and rhythm abdomen is soft is not distended or firm  Relevant Results               Assessment/Plan                  Principal Problem:    Gastrointestinal hemorrhage, unspecified gastrointestinal hemorrhage type  Active Problems:    Seizures (Multi)    HTN (hypertension)    Hyperlipidemia    Diabetes mellitus, type 2 (Multi)    Pneumonia     History of home oxygen therapy    Obesity    For now he is H&H is stable and has been no further transfusions required renal function looks excellent placed him on Flomax see if his bladder scans have been very elevated so we will see if that will help with relieving those large balances so far repeat the renal panel as well monitor the renal function looks good right now.    He is going to need an additional 5 days to remain in isolation until the 26th for a 10-day total isolation because of COVID-19 otherwise I have restarted his meds I do not want his anticoagulation restarted just for 1 week to let this rectal ulcer in the area of significant bleeding that he had heals completely              Mendel Regan, DO

## 2024-07-22 VITALS
SYSTOLIC BLOOD PRESSURE: 128 MMHG | BODY MASS INDEX: 36.45 KG/M2 | DIASTOLIC BLOOD PRESSURE: 66 MMHG | HEIGHT: 78 IN | TEMPERATURE: 97.2 F | HEART RATE: 83 BPM | RESPIRATION RATE: 16 BRPM | WEIGHT: 315 LBS | OXYGEN SATURATION: 97 %

## 2024-07-22 LAB
GLUCOSE BLD MANUAL STRIP-MCNC: 121 MG/DL (ref 74–99)
GLUCOSE BLD MANUAL STRIP-MCNC: 125 MG/DL (ref 74–99)
GLUCOSE BLD MANUAL STRIP-MCNC: 186 MG/DL (ref 74–99)

## 2024-07-22 PROCEDURE — 2500000004 HC RX 250 GENERAL PHARMACY W/ HCPCS (ALT 636 FOR OP/ED): Performed by: PHYSICIAN ASSISTANT

## 2024-07-22 PROCEDURE — 2500000001 HC RX 250 WO HCPCS SELF ADMINISTERED DRUGS (ALT 637 FOR MEDICARE OP): Performed by: INTERNAL MEDICINE

## 2024-07-22 PROCEDURE — 2500000002 HC RX 250 W HCPCS SELF ADMINISTERED DRUGS (ALT 637 FOR MEDICARE OP, ALT 636 FOR OP/ED): Performed by: PHYSICIAN ASSISTANT

## 2024-07-22 PROCEDURE — C9113 INJ PANTOPRAZOLE SODIUM, VIA: HCPCS | Performed by: PHYSICIAN ASSISTANT

## 2024-07-22 PROCEDURE — 2500000001 HC RX 250 WO HCPCS SELF ADMINISTERED DRUGS (ALT 637 FOR MEDICARE OP): Performed by: PHYSICIAN ASSISTANT

## 2024-07-22 PROCEDURE — 82947 ASSAY GLUCOSE BLOOD QUANT: CPT

## 2024-07-22 ASSESSMENT — PAIN DESCRIPTION - LOCATION: LOCATION: LEG

## 2024-07-22 ASSESSMENT — COGNITIVE AND FUNCTIONAL STATUS - GENERAL
HELP NEEDED FOR BATHING: TOTAL
DAILY ACTIVITIY SCORE: 7
TOILETING: TOTAL
WALKING IN HOSPITAL ROOM: TOTAL
TURNING FROM BACK TO SIDE WHILE IN FLAT BAD: TOTAL
DRESSING REGULAR UPPER BODY CLOTHING: TOTAL
PERSONAL GROOMING: TOTAL
STANDING UP FROM CHAIR USING ARMS: TOTAL
CLIMB 3 TO 5 STEPS WITH RAILING: TOTAL
EATING MEALS: A LOT
MOVING FROM LYING ON BACK TO SITTING ON SIDE OF FLAT BED WITH BEDRAILS: TOTAL
DRESSING REGULAR LOWER BODY CLOTHING: TOTAL

## 2024-07-22 ASSESSMENT — PAIN - FUNCTIONAL ASSESSMENT: PAIN_FUNCTIONAL_ASSESSMENT: 0-10

## 2024-07-22 ASSESSMENT — PAIN SCALES - GENERAL
PAINLEVEL_OUTOF10: 4
PAINLEVEL_OUTOF10: 0 - NO PAIN

## 2024-07-22 NOTE — CARE PLAN
Problem: Skin  Goal: Decreased wound size/increased tissue granulation at next dressing change  Outcome: Progressing  Goal: Participates in plan/prevention/treatment measures  Outcome: Progressing  Goal: Prevent/manage excess moisture  Outcome: Progressing  Goal: Prevent/minimize sheer/friction injuries  Outcome: Progressing  Goal: Promote/optimize nutrition  Outcome: Progressing  Goal: Promote skin healing  Outcome: Progressing     Problem: Fall/Injury  Goal: Not fall by end of shift  Outcome: Progressing  Goal: Be free from injury by end of the shift  Outcome: Progressing  Goal: Verbalize understanding of personal risk factors for fall in the hospital  Outcome: Progressing  Goal: Verbalize understanding of risk factor reduction measures to prevent injury from fall in the home  Outcome: Progressing  Goal: Use assistive devices by end of the shift  Outcome: Progressing  Goal: Pace activities to prevent fatigue by end of the shift  Outcome: Progressing     Problem: Pain - Adult  Goal: Verbalizes/displays adequate comfort level or baseline comfort level  Outcome: Progressing     Problem: Safety - Adult  Goal: Free from fall injury  Outcome: Progressing     Problem: Discharge Planning  Goal: Discharge to home or other facility with appropriate resources  Outcome: Progressing     Problem: Chronic Conditions and Co-morbidities  Goal: Patient's chronic conditions and co-morbidity symptoms are monitored and maintained or improved  Outcome: Progressing     Problem: Diabetes  Goal: Maintain glucose levels >70mg/dl to <250mg/dl throughout shift  Outcome: Progressing     Problem: Dressing Upper Extremities  Goal: STG - Patient will dress upper body with mod asssit  Outcome: Progressing     Problem: Eating  Goal: STG - Patient feeds self 20 % of meal  with set up and finger food.drink   Outcome: Progressing     Problem: Grooming  Goal: STG - Patient completes grooming with set up and mod assist   Outcome: Progressing      Problem: Pain  Goal: Takes deep breaths with improved pain control throughout the shift  Outcome: Progressing  Goal: Turns in bed with improved pain control throughout the shift  Outcome: Progressing  Goal: Free from opioid side effects throughout the shift  Outcome: Progressing  Goal: Free from acute confusion related to pain meds throughout the shift  Outcome: Progressing

## 2024-07-22 NOTE — PROGRESS NOTES
Ash Rodrigues is a 63 y.o. male on day 2 of admission presenting with Gastrointestinal hemorrhage, unspecified gastrointestinal hemorrhage type.      Subjective   Seen today overall condition is the same no changes at this time hemodynamically stable saturations have been good is COVID-19 positive he will need to remain in isolation for about 4 more days          Objective     Last Recorded Vitals  /69 (BP Location: Left arm, Patient Position: Lying)   Pulse 81   Temp 36 °C (96.8 °F) (Temporal)   Resp 18   Wt 150 kg (330 lb)   SpO2 96%   Intake/Output last 3 Shifts:    Intake/Output Summary (Last 24 hours) at 7/22/2024 1405  Last data filed at 7/22/2024 1124  Gross per 24 hour   Intake 663 ml   Output 3100 ml   Net -2437 ml       Admission Weight  Weight: 150 kg (330 lb) (07/16/24 0905)    Daily Weight  07/16/24 : 150 kg (330 lb)    Image Results  Electrocardiogram, 12-lead PRN ACS symptoms  Sinus rhythm  Abnormal R-wave progression, early transition    See ED provider note for full interpretation and clinical correlation  Confirmed by Zena Galeano ([id number]) on 7/18/2024 10:11:20 AM      Physical Exam  Awake alert better on the 5mg but keeps asking for 10mg  Relevant Results               Assessment/Plan                  Principal Problem:    Gastrointestinal hemorrhage, unspecified gastrointestinal hemorrhage type  Active Problems:    Seizures (Multi)    HTN (hypertension)    Hyperlipidemia    Diabetes mellitus, type 2 (Multi)    Pneumonia    History of home oxygen therapy    Obesity    Covid isolation until thursday  For now he is H&H is stable and has been no further transfusions required renal function looks excellent placed him on Flomax see if his bladder scans have been very elevated so we will see if that will help with relieving those large balances so far repeat the renal panel as well monitor the renal function looks good right now.     He is going to need an additional 4 days to remain in  isolation until the 26th for a 10-day total isolation because of COVID-19 otherwise I have restarted his meds I do not want his anticoagulation restarted just for 1 week to let this rectal ulcer in the area of significant bleeding that he had heals completely               Mendel Regan, DO

## 2024-07-22 NOTE — PROGRESS NOTES
Pt has a dc order. Facility was attempting to skill patient as [place] [facility] [place] response.     1115 Per facility the patient's insurance plans to deny SNF unless MD would like to do a P2P. Secure chat sent to Dr. Regan and Care Transitions Supervisor Ramila.     1345 No reply yet from DR. Regan.  This [facility] reached out again on secure chat message sent earlier asking for direction from Supervisor Ramila.     1350 Supervisor recommends calling Dr. Regan. Call made to his phone, left vm. Secure chat updated. Bedside RN updated.     1500 Pt will return LTC. Facility updated. DSC tasked to send GF/AVS and arrange transport.     1600 Transport scheduled for 5:30pm. Facility and bedside RN updated. Attempted to reach pt's mother via phone and the number is not in service. Bedside RN aware.

## 2024-08-01 LAB
LABORATORY COMMENT REPORT: NORMAL
PATH REPORT.COMMENTS IMP SPEC: NORMAL
PATH REPORT.FINAL DX SPEC: NORMAL
PATH REPORT.GROSS SPEC: NORMAL
PATH REPORT.TOTAL CANCER: NORMAL

## 2024-08-28 NOTE — DISCHARGE SUMMARY
Discharge Diagnosis  Gastrointestinal hemorrhage, unspecified gastrointestinal hemorrhage type    Issues Requiring Follow-Up      Discharge Meds     Your medication list        START taking these medications        Instructions Last Dose Given Next Dose Due   tamsulosin 0.4 mg 24 hr capsule  Commonly known as: Flomax      Take 1 capsule (0.4 mg) by mouth once daily.              CHANGE how you take these medications        Instructions Last Dose Given Next Dose Due   apixaban 2.5 mg tablet  Commonly known as: Eliquis  What changed: additional instructions      Take 1 tablet (2.5 mg) by mouth 2 times a day. Restart this medication in 1 more week Monday 7/29              CONTINUE taking these medications        Instructions Last Dose Given Next Dose Due   acetaminophen 325 mg tablet  Commonly known as: Tylenol           acetaminophen 500 mg tablet  Commonly known as: Tylenol           acyclovir 200 mg capsule  Commonly known as: Zovirax           albuterol 2.5 mg /3 mL (0.083 %) nebulizer solution           amLODIPine 10 mg tablet  Commonly known as: Norvasc           Aranesp (in polysorbate) 100 mcg/0.5 mL injection  Generic drug: darbepoetin mihai           atorvastatin 40 mg tablet  Commonly known as: Lipitor           benzonatate 100 mg capsule  Commonly known as: Tessalon           calcium carbonate 200 mg calcium chewable tablet  Commonly known as: Tums           calcium carbonate-vitamin D3 500 mg-5 mcg (200 unit) tablet           carvedilol 12.5 mg tablet  Commonly known as: Coreg           diclofenac sodium 1 % gel  Commonly known as: Voltaren      Apply 4.5 inches (4 g) topically 4 times a day.       docusate sodium 100 mg capsule  Commonly known as: Colace           Dulcolax (bisacodyl) 10 mg suppository  Generic drug: bisacodyl           ergocalciferol 1.25 MG (25439 UT) capsule  Commonly known as: Vitamin D-2           fluticasone 50 mcg/actuation nasal spray  Commonly known as: Flonase            furosemide 40 mg tablet  Commonly known as: Lasix           gabapentin 300 mg capsule  Commonly known as: Neurontin           loratadine 10 mg tablet  Commonly known as: Claritin           mesalamine 1,000 mg suppository  Commonly known as: Canasa           metFORMIN 1,000 mg tablet  Commonly known as: Glucophage           methylPREDNISolone 4 mg tablets  Commonly known as: Medrol Dospak      Take as directed on package.       ondansetron 8 mg tablet  Commonly known as: Zofran           oxyCODONE 5 mg immediate release tablet  Commonly known as: Roxicodone           oxyCODONE 5 mg immediate release tablet  Commonly known as: Roxicodone           pantoprazole 40 mg EC tablet  Commonly known as: ProtoNix           polyethylene glycol 17 gram packet  Commonly known as: Glycolax, Miralax           Revlimid 25 mg capsule  Generic drug: lenalidomide           sodium chloride 0.65 % nasal spray  Commonly known as: Ocean           valproic acid 250 mg capsule  Commonly known as: Depakene           Xtandi 80 mg tablet  Generic drug: enzalutamide                     Where to Get Your Medications        Information about where to get these medications is not yet available    Ask your nurse or doctor about these medications  apixaban 2.5 mg tablet  tamsulosin 0.4 mg 24 hr capsule         Test Results Pending At Discharge  Pending Labs       Order Current Status    Extra Urine Gray Tube Collected (07/17/24 3776)    Urinalysis with Reflex Culture and Microscopic In process            Hospital Course    63 y.o. male with past medical history significant for diabetes mellitus, HTN, HLD, prostate cancer s/p radiation, multiple myeloma, CKD, history of seizures, and meningioma who presents to the ED for concerns for a GI bleed.  Currently resides at [facility].  Patient's care facility noticed blood in the stool overnight, so he was sent to the [facility] ED for further evaluation.  Patient is on Eliquis twice daily.   Also admits to right-sided abdominal pain earlier, however denies any abdominal pain currently.  Patient does admit to a mild cough, however does state he was recently COVID-positive but is unsure when he tested positive.  Of note, patient recently seen on 6/19 at [facility] for fever and concern for pneumonia.  Blood cultures did show staph epidermis, patient did receive IV antibiotics.  Patient follows with oncology for prostate cancer with metastatic cancer involving the bones, currently being treated.  Patient was also having hypercalcemia while in the hospital, was given bisphosphonates and IV fluids and had resultant hypocalcemia.  Patient currently denies any complaints or pains in the ED, is only stating his nose feels dry and he is having a hard time breathing through it.  Denies headaches, dizziness, chest pains, shortness of breath, abdominal pain or nausea, pain with bowel movements, changes in urination, or fever/chills.  States he does not ambulate very much.  A multitude of problems including COVID-positive thrombocytopenia anemia acute on chronic multiple problems all completed taking care of felt that he could go back discharged in stable condition discharge day management greater than 30 minutes total time thank you    Pertinent Physical Exam At Time of Discharge  Physical Exam    Outpatient Follow-Up  No future appointments.      Mendel Regan,